# Patient Record
Sex: FEMALE | Race: WHITE | NOT HISPANIC OR LATINO | Employment: FULL TIME | URBAN - METROPOLITAN AREA
[De-identification: names, ages, dates, MRNs, and addresses within clinical notes are randomized per-mention and may not be internally consistent; named-entity substitution may affect disease eponyms.]

---

## 2017-02-02 ENCOUNTER — GENERIC CONVERSION - ENCOUNTER (OUTPATIENT)
Dept: OTHER | Facility: OTHER | Age: 52
End: 2017-02-02

## 2017-02-02 LAB — HBA1C MFR BLD HPLC: 5.6 %

## 2017-05-15 ENCOUNTER — ALLSCRIPTS OFFICE VISIT (OUTPATIENT)
Dept: OTHER | Facility: OTHER | Age: 52
End: 2017-05-15

## 2017-06-05 ENCOUNTER — ANESTHESIA EVENT (OUTPATIENT)
Dept: GASTROENTEROLOGY | Facility: HOSPITAL | Age: 52
End: 2017-06-05
Payer: COMMERCIAL

## 2017-06-06 ENCOUNTER — ANESTHESIA (OUTPATIENT)
Dept: GASTROENTEROLOGY | Facility: HOSPITAL | Age: 52
End: 2017-06-06
Payer: COMMERCIAL

## 2017-06-06 ENCOUNTER — GENERIC CONVERSION - ENCOUNTER (OUTPATIENT)
Dept: PERIOP | Facility: HOSPITAL | Age: 52
End: 2017-06-06

## 2017-06-06 ENCOUNTER — HOSPITAL ENCOUNTER (OUTPATIENT)
Facility: HOSPITAL | Age: 52
Setting detail: OUTPATIENT SURGERY
Discharge: HOME | End: 2017-06-06
Attending: SURGERY | Admitting: SURGERY
Payer: COMMERCIAL

## 2017-06-06 VITALS
OXYGEN SATURATION: 99 % | BODY MASS INDEX: 28.61 KG/M2 | RESPIRATION RATE: 16 BRPM | SYSTOLIC BLOOD PRESSURE: 107 MMHG | WEIGHT: 178 LBS | HEIGHT: 66 IN | DIASTOLIC BLOOD PRESSURE: 58 MMHG | HEART RATE: 65 BPM | TEMPERATURE: 96.2 F

## 2017-06-06 RX ORDER — PROPOFOL 10 MG/ML
INJECTION, EMULSION INTRAVENOUS AS NEEDED
Status: DISCONTINUED | OUTPATIENT
Start: 2017-06-06 | End: 2017-06-06 | Stop reason: SURG

## 2017-06-06 RX ORDER — SODIUM CHLORIDE 9 MG/ML
125 INJECTION, SOLUTION INTRAVENOUS CONTINUOUS
Status: DISCONTINUED | OUTPATIENT
Start: 2017-06-06 | End: 2017-06-06 | Stop reason: HOSPADM

## 2017-06-06 RX ADMIN — PROPOFOL 50 MG: 10 INJECTION, EMULSION INTRAVENOUS at 07:39

## 2017-06-06 RX ADMIN — PROPOFOL 50 MG: 10 INJECTION, EMULSION INTRAVENOUS at 07:44

## 2017-06-06 RX ADMIN — SODIUM CHLORIDE 125 ML/HR: 0.9 INJECTION, SOLUTION INTRAVENOUS at 06:59

## 2017-06-06 RX ADMIN — PROPOFOL 100 MG: 10 INJECTION, EMULSION INTRAVENOUS at 07:37

## 2017-06-13 DIAGNOSIS — K57.32 DIVERTICULITIS OF LARGE INTESTINE WITHOUT PERFORATION OR ABSCESS WITHOUT BLEEDING: ICD-10-CM

## 2017-06-13 DIAGNOSIS — R10.9 ABDOMINAL PAIN: ICD-10-CM

## 2017-06-15 ENCOUNTER — TRANSCRIBE ORDERS (OUTPATIENT)
Dept: LAB | Facility: CLINIC | Age: 52
End: 2017-06-15

## 2017-06-15 ENCOUNTER — APPOINTMENT (OUTPATIENT)
Dept: LAB | Facility: CLINIC | Age: 52
End: 2017-06-15
Payer: COMMERCIAL

## 2017-06-15 DIAGNOSIS — K57.32 DIVERTICULITIS OF COLON (WITHOUT MENTION OF HEMORRHAGE)(562.11): Primary | ICD-10-CM

## 2017-06-15 DIAGNOSIS — Z00.8 HEALTH EXAMINATION IN POPULATION SURVEY: ICD-10-CM

## 2017-06-15 DIAGNOSIS — K57.32 DIVERTICULITIS OF COLON (WITHOUT MENTION OF HEMORRHAGE)(562.11): ICD-10-CM

## 2017-06-15 DIAGNOSIS — Z00.8 HEALTH EXAMINATION IN POPULATION SURVEY: Primary | ICD-10-CM

## 2017-06-15 LAB
CHOLEST SERPL-MCNC: 179 MG/DL (ref 50–200)
EST. AVERAGE GLUCOSE BLD GHB EST-MCNC: 117 MG/DL
HBA1C MFR BLD: 5.7 % (ref 4.2–6.3)
HDLC SERPL-MCNC: 65 MG/DL (ref 40–60)
LDLC SERPL CALC-MCNC: 97 MG/DL (ref 0–100)
TRIGL SERPL-MCNC: 84 MG/DL

## 2017-06-15 PROCEDURE — 86255 FLUORESCENT ANTIBODY SCREEN: CPT

## 2017-06-15 PROCEDURE — 83036 HEMOGLOBIN GLYCOSYLATED A1C: CPT

## 2017-06-15 PROCEDURE — 80061 LIPID PANEL: CPT

## 2017-06-15 PROCEDURE — 82784 ASSAY IGA/IGD/IGG/IGM EACH: CPT

## 2017-06-15 PROCEDURE — 36415 COLL VENOUS BLD VENIPUNCTURE: CPT

## 2017-06-15 PROCEDURE — 83516 IMMUNOASSAY NONANTIBODY: CPT

## 2017-06-20 ENCOUNTER — GENERIC CONVERSION - ENCOUNTER (OUTPATIENT)
Dept: OTHER | Facility: OTHER | Age: 52
End: 2017-06-20

## 2017-07-11 ENCOUNTER — GENERIC CONVERSION - ENCOUNTER (OUTPATIENT)
Dept: OTHER | Facility: OTHER | Age: 52
End: 2017-07-11

## 2017-07-11 ENCOUNTER — LAB REQUISITION (OUTPATIENT)
Dept: LAB | Facility: HOSPITAL | Age: 52
End: 2017-07-11
Payer: COMMERCIAL

## 2017-07-11 DIAGNOSIS — R10.9 ABDOMINAL PAIN: ICD-10-CM

## 2017-07-11 LAB
BILIRUB UR QL STRIP: NEGATIVE
CLARITY UR: NORMAL
COLOR UR: CLEAR
GLUCOSE (HISTORICAL): NEGATIVE
HGB UR QL STRIP.AUTO: NEGATIVE
KETONES UR STRIP-MCNC: NEGATIVE MG/DL
LEUKOCYTE ESTERASE UR QL STRIP: NORMAL
NITRITE UR QL STRIP: NEGATIVE
PH UR STRIP.AUTO: 5 [PH]
PROT UR STRIP-MCNC: NEGATIVE MG/DL
SP GR UR STRIP.AUTO: 1
UROBILINOGEN UR QL STRIP.AUTO: NEGATIVE

## 2017-07-11 PROCEDURE — 87086 URINE CULTURE/COLONY COUNT: CPT | Performed by: INTERNAL MEDICINE

## 2017-07-12 LAB — BACTERIA UR CULT: NORMAL

## 2017-08-16 ENCOUNTER — ALLSCRIPTS OFFICE VISIT (OUTPATIENT)
Dept: OTHER | Facility: OTHER | Age: 52
End: 2017-08-16

## 2017-08-16 ENCOUNTER — TRANSCRIBE ORDERS (OUTPATIENT)
Dept: ADMINISTRATIVE | Facility: HOSPITAL | Age: 52
End: 2017-08-16

## 2017-08-16 DIAGNOSIS — R63.5 ABNORMAL WEIGHT GAIN: ICD-10-CM

## 2017-08-16 DIAGNOSIS — I34.0 NONRHEUMATIC MITRAL VALVE INSUFFICIENCY: ICD-10-CM

## 2017-08-16 DIAGNOSIS — R73.01 IMPAIRED FASTING GLUCOSE: ICD-10-CM

## 2017-08-16 DIAGNOSIS — E55.9 VITAMIN D DEFICIENCY: ICD-10-CM

## 2017-08-16 DIAGNOSIS — I34.1 J.B. BARLOW'S SYNDROME: Primary | ICD-10-CM

## 2017-08-16 DIAGNOSIS — E66.3 OVERWEIGHT(278.02): ICD-10-CM

## 2017-08-21 ENCOUNTER — GENERIC CONVERSION - ENCOUNTER (OUTPATIENT)
Dept: OTHER | Facility: OTHER | Age: 52
End: 2017-08-21

## 2017-08-21 ENCOUNTER — APPOINTMENT (OUTPATIENT)
Dept: LAB | Facility: CLINIC | Age: 52
End: 2017-08-21
Payer: COMMERCIAL

## 2017-08-21 ENCOUNTER — TRANSCRIBE ORDERS (OUTPATIENT)
Dept: LAB | Facility: CLINIC | Age: 52
End: 2017-08-21

## 2017-08-21 DIAGNOSIS — R63.5 ABNORMAL WEIGHT GAIN: ICD-10-CM

## 2017-08-21 DIAGNOSIS — E66.3 OVERWEIGHT(278.02): ICD-10-CM

## 2017-08-21 DIAGNOSIS — R73.01 IMPAIRED FASTING GLUCOSE: ICD-10-CM

## 2017-08-21 DIAGNOSIS — E55.9 VITAMIN D DEFICIENCY: ICD-10-CM

## 2017-08-21 LAB
25(OH)D3 SERPL-MCNC: 28.3 NG/ML (ref 30–100)
ALBUMIN SERPL BCP-MCNC: 3.9 G/DL (ref 3.5–5)
ALP SERPL-CCNC: 80 U/L (ref 46–116)
ALT SERPL W P-5'-P-CCNC: 20 U/L (ref 12–78)
ANION GAP SERPL CALCULATED.3IONS-SCNC: 3 MMOL/L (ref 4–13)
AST SERPL W P-5'-P-CCNC: 11 U/L (ref 5–45)
BILIRUB SERPL-MCNC: 0.48 MG/DL (ref 0.2–1)
BUN SERPL-MCNC: 12 MG/DL (ref 5–25)
CALCIUM SERPL-MCNC: 9.4 MG/DL (ref 8.3–10.1)
CHLORIDE SERPL-SCNC: 103 MMOL/L (ref 100–108)
CO2 SERPL-SCNC: 33 MMOL/L (ref 21–32)
CREAT SERPL-MCNC: 0.67 MG/DL (ref 0.6–1.3)
GFR SERPL CREATININE-BSD FRML MDRD: 101 ML/MIN/1.73SQ M
GLUCOSE P FAST SERPL-MCNC: 80 MG/DL (ref 65–99)
POTASSIUM SERPL-SCNC: 3.8 MMOL/L (ref 3.5–5.3)
PROT SERPL-MCNC: 7.1 G/DL (ref 6.4–8.2)
SODIUM SERPL-SCNC: 139 MMOL/L (ref 136–145)

## 2017-08-21 PROCEDURE — 80053 COMPREHEN METABOLIC PANEL: CPT

## 2017-08-21 PROCEDURE — 82306 VITAMIN D 25 HYDROXY: CPT

## 2017-08-21 PROCEDURE — 36415 COLL VENOUS BLD VENIPUNCTURE: CPT

## 2017-08-29 ENCOUNTER — ALLSCRIPTS OFFICE VISIT (OUTPATIENT)
Dept: OTHER | Facility: OTHER | Age: 52
End: 2017-08-29

## 2017-08-31 ENCOUNTER — HOSPITAL ENCOUNTER (OUTPATIENT)
Dept: NON INVASIVE DIAGNOSTICS | Facility: CLINIC | Age: 52
Discharge: HOME | End: 2017-08-31
Payer: COMMERCIAL

## 2017-08-31 DIAGNOSIS — I34.0 NONRHEUMATIC MITRAL VALVE INSUFFICIENCY: ICD-10-CM

## 2017-08-31 PROCEDURE — 93306 TTE W/DOPPLER COMPLETE: CPT

## 2017-09-01 ENCOUNTER — GENERIC CONVERSION - ENCOUNTER (OUTPATIENT)
Dept: OTHER | Facility: OTHER | Age: 52
End: 2017-09-01

## 2017-09-07 ENCOUNTER — GENERIC CONVERSION - ENCOUNTER (OUTPATIENT)
Dept: OTHER | Facility: OTHER | Age: 52
End: 2017-09-07

## 2017-09-14 ENCOUNTER — GENERIC CONVERSION - ENCOUNTER (OUTPATIENT)
Dept: OTHER | Facility: OTHER | Age: 52
End: 2017-09-14

## 2017-09-15 ENCOUNTER — ALLSCRIPTS OFFICE VISIT (OUTPATIENT)
Dept: OTHER | Facility: OTHER | Age: 52
End: 2017-09-15

## 2017-09-21 ENCOUNTER — GENERIC CONVERSION - ENCOUNTER (OUTPATIENT)
Dept: OTHER | Facility: OTHER | Age: 52
End: 2017-09-21

## 2018-01-10 NOTE — CONSULTS
Chief Complaint  Chief Complaint Free Text Note Form: New patient here for MWM consult; Stop Bang 1/8  History of Present Illness  Free Text HPI: Excess weight/weight gain-  Severity: Mild  Onset: past 5 years  Modifiers: hectic work schedule, taking care of elderly patients, decreased physical activity; regular exercise has been the most helpful   Associated Symptoms: feels horrible, self image issues  Past Medical History    1  History of Colon cancer screening (V76 51) (Z12 11)   2  History of hematuria (V13 09) (Z87 448)   3  History of obesity (V13 89) (Z86 39)  Active Problems And Past Medical History Reviewed: The active problems and past medical history were reviewed and updated today  Assessment    1  Weight gain (783 1) (R63 5)   2  Impaired fasting glucose (790 21) (R73 01)   3  Mitral regurgitation (424 0) (I34 0)   4  Overweight (BMI 25 0-29 9) (278 02) (E66 3)   5  Vitamin D deficiency (268 9) (E55 9)    Plan   1  (1) COMPREHENSIVE METABOLIC PANEL; Status:Active; Requested for:45Lxu9389;    2  (1) VITAMIN D 25-HYDROXY; Status:Active; Requested for:44Sjk8360;    3  ECHO COMPLETE WITH CONTRAST IF INDICATED; Status:Need Information - Financial   Authorization; Requested for:67Dml8825;     Discussion/Summary  Discussion Summary:   78-year-old female with impaired fasting glucose and excess weight here to pursue medical weight management to improve weight and health  Excess weight/weight gain:  -discussed options of conservative vs ANDREA program +/- meal replacement vs VLCD  -initial focus of 5-10% weight loss over 3-6 mos for improved health  -screening labs-TSH normal 1 year ago, check CMP    Impaired fasting glucose:  -A1c 5 7%  -Lifestyle changes for now    Mitral regurgitation:  -Diagnosed 20+ years ago did not have a repeat echo  Check 2-D echo   Denies symptoms    Vitamin D deficiency:  -Repeat vitamin D    Patient interested in intensive lifestyle intervention program  We'll set up visit with dietitian, I will see patient again at program end ~12 weeks    Goals:  1  Food log www  myfitnesspal com  2  No sugary beverages  At least 64oz of water daily  3  Increase physical activity by 10 minutes daily  4  3041-9729 calories, see sample menu  Patient's Capacity to Self-Care: Patient is able to Self-Care  Understands and agrees with treatment plan: The treatment plan was reviewed with the patient/guardian  The patient/guardian understands and agrees with the treatment plan   Self Referrals:   Self Referrals: No      Review of Systems  Focused-Female:   Constitutional: no fever  ENT: no sore throat  Cardiovascular: no chest pain and no palpitations  Respiratory: no shortness of breath and no wheezing  Gastrointestinal: no abdominal pain, no nausea and no constipation  Genitourinary: no dysuria  Musculoskeletal: no arthralgias  Integumentary: no rashes  Neurological: no headache  Other Symptoms: Psych: denies depression/anxiety  Active Problems    1  Diverticulitis of colon (562 11) (K57 32)   2  Flank pain (789 09) (R10 9)   3  Impaired fasting glucose (790 21) (R73 01)   4  Lipoma (214 9) (D17 9)   5  Microscopic hematuria (599 72) (R31 29)   6  Mitral regurgitation (424 0) (I34 0)   7  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)   8  Vitamin D deficiency (268 9) (E55 9)    Surgical History    1  Denied: History Of Prior Surgery  Surgical History Reviewed: The surgical history was reviewed and updated today  Family History  Mother    1  Family history of hypertension (V17 49) (Z82 49)   2  Family history of Stroke  Father    3  Family history of    4  Family history of diabetes mellitus (V18 0) (Z83 3)   5  Family history of Non-Hodgkin lymphoma  Brother    6  Family history of coronary artery disease (V17 3) (Z82 49)   7  Family history of diabetes mellitus (V18 0) (Z83 3)  Other    8   Family history of coronary artery disease (V17 3) (Z82 49)  Family History Reviewed: The family history was reviewed and updated today  Social History    · Never A Smoker  Social History Reviewed: The social history was reviewed and updated today  Current Meds   1  No Reported Medications  Requested for: 30Bso4523 Recorded  Medication List Reviewed: The medication list was reviewed and updated today  Allergies    1  No Known Drug Allergies    2  Dust   3  Mold   4  No Known Environmental Allergies   5  No Known Food Allergies    Vitals  Vital Signs    Recorded: 98Tgu4311 08:07AM   Temperature 98 1 F   Heart Rate 76   Respiration 16   Systolic 182   Diastolic 76   Height 5 ft 6 in   Weight 182 lb    BMI Calculated 29 38   BSA Calculated 1 92   Waist Circumference 33   Neck Circumference 13     Physical Exam    Constitutional   General appearance: Abnormal   well developed and overweight  Eyes No conjunctival pallor  Ears, Nose, Mouth, and Throat Moist oral mucosa  Pulmonary   Respiratory effort: No increased work of breathing or signs of respiratory distress  Auscultation of lungs: Clear to auscultation  Cardiovascular   Auscultation of heart: Normal rate and rhythm, normal S1 and S2, without murmurs  No appreciable murmur  Abdomen   Abdomen: Abnormal   The abdomen was soft and nontender     Musculoskeletal   Gait and station: Normal     Psychiatric   Orientation to person, place, and time: Normal     Mood and affect: Normal          Signatures   Electronically signed by : VIC Valencia ; Aug 16 2017  9:03AM EST                       (Author)

## 2018-01-11 NOTE — RESULT NOTES
Verified Results  ECHO COMPLETE WITH CONTRAST IF INDICATED 55Ryg1218 10:40AM Norbert Butterfield Order Number: [id number]   Performing Comments: appt 17 @@ 1pm [facility] [place] in [facility]  No Prior auth required  - Patient Instructions: To schedule this appointment, please contact Central Scheduling at [phone]  Test Name Result Flag Reference   ECHO COMPLETE WITH CONTRAST IF INDICATED (Report)     Pete Brown 35  [facility], [address]   [phone]     Transthoracic Echocardiogram   2D, M-mode, Doppler, and Color Doppler     Study date: 31-Aug-2017     Patient: Leo Mahoney   MR number: [id number]   Account number: [de-identified]   : 1965   Age: 46 years   Gender: Female   Status: Outpatient   Location: [address] [place] [address]   Height: 66 in   Weight: 180 lb   BP: 120/ 64 mmHg     Indications: Murmur  Diagnoses: I34 9 - Nonrheumatic mitral valve disorder, unspecified     Sonographer: AMNA De La O   Primary Physician: Dinesh Barnes MD   Referring Physician: Aissatou Tolentino MD   Group: [facility]   Interpreting Physician: DO SANDEEP Lebron     LEFT VENTRICLE:   Systolic function was normal  Ejection fraction was estimated to be 60 %  There were no regional wall motion abnormalities  HISTORY: PRIOR HISTORY: Patient has no history of cardiovascular disease  PROCEDURE: The study was performed in the [address] [place] [address]  This was a routine study  The transthoracic approach was used  The study included complete 2D imaging, M-mode, complete spectral Doppler, and color Doppler  The   heart rate was 65 bpm, at the start of the study  Image quality was adequate  LEFT VENTRICLE: Size was normal  Systolic function was normal  Ejection fraction was estimated to be 60 %  There were no regional wall motion abnormalities   Wall thickness was normal  DOPPLER: Left ventricular diastolic function parameters   were normal      RIGHT VENTRICLE: The size was normal  Systolic function was normal  Wall thickness was normal      LEFT ATRIUM: Size was normal      RIGHT ATRIUM: Size was normal      MITRAL VALVE: Valve structure was normal  There was normal leaflet separation  DOPPLER: The transmitral velocity was within the normal range  There was no evidence for stenosis  There was no regurgitation  AORTIC VALVE: The valve was trileaflet  Leaflets exhibited normal thickness and normal cuspal separation  DOPPLER: Transaortic velocity was within the normal range  There was no evidence for stenosis  There was no regurgitation  TRICUSPID VALVE: The valve structure was normal  There was normal leaflet separation  DOPPLER: The transtricuspid velocity was within the normal range  There was no evidence for stenosis  There was no regurgitation  PULMONIC VALVE: Leaflets exhibited normal thickness, no calcification, and normal cuspal separation  DOPPLER: The transpulmonic velocity was within the normal range  There was no regurgitation  PERICARDIUM: There was no pericardial effusion  AORTA: The root exhibited normal size  SYSTEMIC VEINS: IVC: The inferior vena cava was normal in size and course  Respirophasic changes were normal      PULMONARY ARTERY: DOPPLER: The tricuspid jet envelope definition was inadequate for estimation of RV systolic pressure  There are no indirect findings (abnormal RV volume or geometry, altered pulmonary flow velocity profile, or leftward   septal displacement) which would suggest moderate or severe pulmonary hypertension       SYSTEM MEASUREMENT TABLES     2D   %FS: 39 3 %   Ao Diam: 2 9 cm   EDV(Teich): 79 5 ml   EF(Teich): 70 1 %   ESV(Teich): 23 7 ml   IVSd: 0 8 cm   LA Area: 12 cm2   LA Diam: 2 9 cm   LVEDV MOD A4C: 70 2 ml   LVEF MOD A4C: 71 2 %   LVESV MOD A4C: 20 2 ml   LVIDd: 4 2 cm   LVIDs: 2 6 cm   LVLd A4C: 7 2 cm   LVLs A4C: 5 5 cm   LVPWd: 0 8 cm   RA Area: 11 5 cm2   RVIDd: 3 2 cm   SV MOD A4C: 49 9 ml   SV(Teich): 55 8 ml     MM   TAPSE: 1 8 cm     PW   AVC: 390 8 ms   E': 0 1 m/s   E/E': 5 1   MV A Nils: 0 6 m/s   MV Dec Dickenson: 2 7 m/s2   MV DecT: 229 5 ms   MV E Nils: 0 6 m/s   MV E/A Ratio: 1   MV PHT: 66 6 ms   MVA By PHT: 3 3 cm2     [employer] Accredited Echocardiography Laboratory     Prepared and electronically signed by     Uli Jackson DO   Signed 31-Aug-2017 16:58:11

## 2018-01-12 VITALS
HEIGHT: 66 IN | WEIGHT: 182 LBS | RESPIRATION RATE: 16 BRPM | HEART RATE: 76 BPM | SYSTOLIC BLOOD PRESSURE: 120 MMHG | BODY MASS INDEX: 29.25 KG/M2 | TEMPERATURE: 98.1 F | DIASTOLIC BLOOD PRESSURE: 76 MMHG

## 2018-01-13 VITALS — BODY MASS INDEX: 28.73 KG/M2 | HEIGHT: 66 IN | WEIGHT: 178.8 LBS

## 2018-01-13 VITALS
RESPIRATION RATE: 16 BRPM | BODY MASS INDEX: 28.63 KG/M2 | DIASTOLIC BLOOD PRESSURE: 82 MMHG | SYSTOLIC BLOOD PRESSURE: 126 MMHG | WEIGHT: 178.13 LBS | HEART RATE: 72 BPM | HEIGHT: 66 IN | TEMPERATURE: 99.2 F

## 2018-01-14 VITALS — BODY MASS INDEX: 29.02 KG/M2 | WEIGHT: 180.6 LBS | HEIGHT: 66 IN

## 2018-01-14 NOTE — RESULT NOTES
Verified Results  (1) COMPREHENSIVE METABOLIC PANEL [id number] 48:42PR Jorge Goodwin Order Number: [id number]     Test Name Result Flag Reference   SODIUM 139 mmol/L  136-145   POTASSIUM 3 8 mmol/L  3 5-5 3   CHLORIDE 103 mmol/L  100-108   CARBON DIOXIDE 33 mmol/L H 21-32   ANION GAP (CALC) 3 mmol/L L 4-13   BLOOD UREA NITROGEN 12 mg/dL  5-25   CREATININE 0 67 mg/dL  0 60-1 30   Standardized to IDMS reference method   CALCIUM 9 4 mg/dL  8 3-10 1   BILI, TOTAL 0 48 mg/dL  0 20-1 00   ALK PHOSPHATAS 80 U/L     ALT (SGPT) 20 U/L  12-78   Specimen collection should occur prior to Sulfasalazine and/or Sulfapyridine administration due to the potential for falsely depressed results  AST(SGOT) 11 U/L  5-45   Specimen collection should occur prior to Sulfasalazine administration due to the potential for falsely depressed results  ALBUMIN 3 9 g/dL  3 5-5 0   TOTAL PROTEIN 7 1 g/dL  6 4-8 2   eGFR 101 ml/min/1 73sq m     [facility] Disease Education Program recommendations are as follows:  GFR calculation is accurate only with a steady state creatinine  Chronic Kidney disease less than 60 ml/min/1 73 sq  meters  Kidney failure less than 15 ml/min/1 73 sq  meters  GLUCOSE FASTING 80 mg/dL  65-99   Specimen collection should occur prior to Sulfasalazine administration due to the potential for falsely depressed results  Specimen collection should occur prior to Sulfapyridine administration due to the potential for falsely elevated results       (1) VITAMIN D 25-HYDROXY 86Vwn2790 11:08AM Starr Maldonado    Order Number: [id number]     Test Name Result Flag Reference   VIT D 25-HYDROX 28 3 ng/mL L 30 0-100 0   This assay is a certified procedure of the CDC Vitamin D Standardization Certification Program (VDSCP)     Deficiency <20ng/ml   Insufficiency 20-30ng/ml   Sufficient  ng/ml     *Patients undergoing fluorescein dye angiography may retain small amounts of fluorescein in the body for 48-72 hours post procedure  Samples containing fluorescein can produce falsely elevated Vitamin D values  If the patient had this procedure, a specimen should be resubmitted post fluorescein clearance

## 2018-01-15 VITALS — HEIGHT: 66 IN | WEIGHT: 180 LBS | BODY MASS INDEX: 28.93 KG/M2

## 2018-01-15 NOTE — PROGRESS NOTES
History of Present Illness  HPI: Norm Liu is here for f/u  Current wt: 179 6 lbs  Loss of 1 2 lbs x 2 wks  Having difficulty having breakfast, finds she starts with a meal replacement but it takes her until 11 a m  to finish  Then skips a m  snack and by end of day consuming <900 calories/day  Discussed importance of breakfast and snack suggestions given as well as suggestions for remembering the snack  We also discussed exercise and ways to fit this in  Bariatric MNT [facility]:   Weight Assessment: IBW: 130 lbs, ABW: 142 2 lbs, Weight Change: 1 2 lb loss x 2 wks, Goal Weight: 160 lbs  Excess calories come from large portions at mealtimes and eating out, alcohol  Dietary Recall:   Breakfast: replacement  Snack: skip  Lunch: replacement  Snack: apple/cheese or pb/carrots  Dinner: salmon, veggies,  Snack: skip   Beverages: water, coffee  Alcohol consumption: wine  Food allergies/intolerances: n/a  Cooking: spouse  Shopping: self  Exercise Frequency:  She exercises   Nutrition Prescription: Estimated calories for weight loss: eCalc BMR 1431, wt loss w/o exercise 717-1217, w/exercise 702-5955, Estimated daily protein needs: 71-89 gm (1 2-1 5 gm/kg IBW) and Estimated daily fluid needs: 69 oz (35 cc/kg IBW)  Nutrition Intervention: Counseling provided with emphasis on portion sizes, healthy snack choices, hydration, protein intake, exercise and food journaling  Education materials provided: [employer] manual    Comprehension: good  Expected Compliance: good  Goals: follow meal plan prescribed, plan meals and snacks daily, food journal, do not skip meals or snacks and 1000 calories using 2 replacements and 0-1 bar  Monitor/Evaluation: weekly weight, food journal, fluid intake and exercise level  Active Problems    1  Diverticulitis of colon (562 11) (K57 32)   2  Flank pain (789 09) (R10 9)   3  Impaired fasting glucose (790 21) (R73 01)   4  Lipoma (214 9) (D17 9)   5  Microscopic hematuria (599 72) (R31 29)   6  Mitral regurgitation (424 0) (I34 0)   7  Overweight (BMI 25 0-29 9) (278 02) (E66 3)   8  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)   9  Vitamin D deficiency (268 9) (E55 9)   10  Weight gain (783 1) (R63 5)    Past Medical History    1  History of Colon cancer screening (V76 51) (Z12 11)   2  History of Colon cancer screening (V76 51) (Z12 11)   3  History of hematuria (V13 09) (Z87 448)   4  History of obesity (V13 89) (Z86 39)   5  History of Screening for deficiency anemia (V78 1) (Z13 0)   6  History of Screening for thyroid disorder (V77 0) (Z13 29)   7  History of Vaginal candidiasis (112 1) (B37 3)    Surgical History    1  Denied: History Of Prior Surgery    Family History  Mother    1  Family history of hypertension (V17 49) (Z82 49)   2  Family history of Stroke  Father    3  Family history of    4  Family history of diabetes mellitus (V18 0) (Z83 3)   5  Family history of Non-Hodgkin lymphoma  Brother    6  Family history of coronary artery disease (V17 3) (Z82 49)   7  Family history of diabetes mellitus (V18 0) (Z83 3)  Other    8  Family history of coronary artery disease (V17 3) (Z82 49)    Social History    · Consumes alcohol occasionally (V49 89) (Z78 9)   · Never A Smoker   · No illicit drug use    Current Meds   1  No Reported Medications  Requested for: 13Eeg2659 Recorded    Allergies    1  No Known Drug Allergies    2  Dust   3  Mold   4  No Known Environmental Allergies   5  No Known Food Allergies    Vitals  Signs   Recorded: 22Elo8613 11:32AM   Height: 5 ft 6 in  Weight: 179 lb 9 6 oz  BMI Calculated: 28 99  BSA Calculated: 1 91    Future Appointments    Date/Time Provider Specialty Site   2017 08:00 AM VIC Neff  Bariatric Medicine [facility]     Signatures   Electronically signed by :  Zeferino Sr, ; Sep 15 2017 12:10PM EST                       (Author)    Electronically signed by VIC Jones ; Sep 15 2017  1:40PM EST                       (Co-author)

## 2018-01-17 NOTE — MISCELLANEOUS
Message   Recorded as Task   Date: 06/07/2017 09:21 AM, Created By: Anamika Lainez   Task Name: Follow Up   Assigned To: [facility],Team   Regarding Patient: Kira Franz, Status: Active   [facility]able [facility] - 07 Jun 2017 9:21 AM     TASK CREATED  Routine post colonoscopy call placed to patient  She had this procedure on 06/06/2017  Left message  on patient's phone number to please give our office a call back  No pathology was sent  Patient needs another colonoscopy in 10 years  Izabela Rodriguez - 15 Andrew 2017 9:28 AM     TASK EDITED  Left another message on patient's phone number to please give our office a call back  Annalee Stewart - 20 Jun 2017 10:09 AM     TASK EDITED  Mailed letter to patients home address  Tasked PCPs office  {Updated pre-visit planning to reflect 10 year follow up }        Active Problems    1  Abnormal colonoscopy (793 4) (R93 3)   2  Acute bronchitis (466 0) (J20 9)   3  Acute cystitis with hematuria (595 0) (N30 01)   4  Colon cancer screening (V76 51) (Z12 11)   5  Diverticulitis of colon (562 11) (K57 32)   6  Encounter for gynecological examination (V72 31) (Z01 419)   7  Encounter for screening mammogram for breast cancer (V76 12) (Z12 31)   8  Encounter for screening mammogram for malignant neoplasm of breast (V76 12)   (Z12 31)   9  Flu vaccine need (V04 81) (Z23)   10  Impaired fasting glucose (790 21) (R73 01)   11  Lipoma (214 9) (D17 9)   12  Microscopic hematuria (599 72) (R31 29)   13  Mitral regurgitation (424 0) (I34 0)   14  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)   15  Screening for deficiency anemia (V78 1) (Z13 0)   16  Screening for thyroid disorder (V77 0) (Z13 29)   17  Sinusitis (473 9) (J32 9)   18  Urinary tract infection (599 0) (N39 0)   19  UTI symptoms (788 99) (R39 9)   20  Vaginal candidiasis (112 1) (B37 3)   21  Vitamin D deficiency (268 9) (E55 9)    Allergies    1  No Known Drug Allergies    2  Dust   3  Mold   4  No Known Environmental Allergies   5   No Known Food Allergies    Signatures   Electronically signed by : Trudi Rubinstein, ; Jun 20 2017 10:09AM EST                       (Author)

## 2018-01-18 NOTE — PROGRESS NOTES
History of Present Illness  HPI: Philipp Espinoza is here for initial RD visit for [employer]  Current wt 180 8 lbs  Came to visit prepared, kept a food journal prior to coming  Biggest issue appears to be skipping meal, long intervals between meals  States she &  are very social and do eat out often  Discussed utilizing 2 meal replacements on these days to naturally keep calories lower and then choosing 2 vegetables vs  starch when out  She also is working on decreasing amount of wine she drinks as she finds it is making her hungrier  No consistent physical activity but does have 2 gym memberships  Would like to lose 20 lbs due to daughter's upcoming wedding  Bariatric MNT [facility]:   Weight Assessment: IBW: 130 lbs, ABW: 142 7 lbs, Goal Weight: 160 lbs  Excess calories come from large portions at mealtimes and meal alcohol, eating out  Dietary Recall:   Breakfast: wake 5-6 (leave for work 7-8)  time varies 80 nay yogurt, 1/4 c cottage cheese, fruit, crushed almonds 15 OR [employer] protein powder (18 gm) w/banana, pb & almond milk OR eggs on weekend    Snack: skip  Lunch: 1-2: salad w/turkey/ham, light balsamic or garlic expressions 1 tbsp, cheese stick  Snack: skip  Dinner: 7-8 p m  >5-6 oz chicken, filet, veggies  Snack: wine   Beverages: water, coffee 1-2 w/creamer  Estimated fluid intake: 24 oz  Alcohol consumption: wine  Food allergies/intolerances: n/a  Cooking: spouse  Shopping: self  She dines out 3-4 times per week  Exercise Frequency:  She does not exercise  Nutrition Prescription: Estimated calories for weight loss: [place] BMR 1479x1  3-1567=544, eCalc bMR 1442, wt loss w/o exercise 730-1230 , Estimated daily protein needs: 71-89 gm (1 2-1 5 gm/kg IBW) and Estimated daily fluid needs: 69 oz (35 cc/kg IBW)  Breakfast: replacement, 8 oz almond milk, 2 tbsp ff half and half  Snack: food snack  Lunch: replacement  Snack: food snack or bar     Dinner: 3 oz lean pro, 2 veggies, 2 fat  Snack: skip or food snack   Nutrition Intervention: Counseling provided with emphasis on meal planning, portion sizes, healthy snack choices, hydration, fiber intake, protein intake, exercise and food journaling  Education materials provided: [employer] manual    Comprehension: good  Expected Compliance: good  Goals: follow meal plan prescribed, reduce portion sizes at mealtimes, plan meals and snacks daily, Choose lower-calorie, lower-fat meal options at home and when dining out, food journal, do not skip meals or snacks and 870-1220 calories using 2 replacements and 0-1 bar  Monitor/Evaluation: weekly weight, food journal, fluid intake and exercise level  Active Problems    1  Diverticulitis of colon (562 11) (K57 32)   2  Flank pain (789 09) (R10 9)   3  Impaired fasting glucose (790 21) (R73 01)   4  Lipoma (214 9) (D17 9)   5  Microscopic hematuria (599 72) (R31 29)   6  Mitral regurgitation (424 0) (I34 0)   7  Overweight (BMI 25 0-29 9) (278 02) (E66 3)   8  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)   9  Vitamin D deficiency (268 9) (E55 9)   10  Weight gain (783 1) (R63 5)    Past Medical History    1  History of Colon cancer screening (V76 51) (Z12 11)   2  History of Colon cancer screening (V76 51) (Z12 11)   3  History of hematuria (V13 09) (Z87 448)   4  History of obesity (V13 89) (Z86 39)   5  History of Screening for deficiency anemia (V78 1) (Z13 0)   6  History of Screening for thyroid disorder (V77 0) (Z13 29)   7  History of Vaginal candidiasis (112 1) (B37 3)    Surgical History    1  Denied: History Of Prior Surgery    Family History  Mother    1  Family history of hypertension (V17 49) (Z82 49)   2  Family history of Stroke  Father    3  Family history of    4  Family history of diabetes mellitus (V18 0) (Z83 3)   5  Family history of Non-Hodgkin lymphoma  Brother    6   Family history of coronary artery disease (V17 3) (Z82 49) 7  Family history of diabetes mellitus (V18 0) (Z83 3)  Other    8  Family history of coronary artery disease (V17 3) (Z82 49)    Social History    · Consumes alcohol occasionally (V49 89) (Z78 9)   · Never A Smoker   · No illicit drug use    Current Meds   1  No Reported Medications  Requested for: 98Gmk6047 Recorded    Allergies    1  No Known Drug Allergies    2  Dust   3  Mold   4  No Known Environmental Allergies   5  No Known Food Allergies    Vitals  Signs   Recorded: 59Kck8078 03:34PM   Height: 5 ft 6 in  Weight: 180 lb 12 8 oz  BMI Calculated: 29 18  BSA Calculated: 1 92    Results/Data  [facility]ita Flowsheet [id number] 03:34PM Bettina Petit     Test Name Result Flag Reference   Height 66     Weight 180 8     Body Fat % 40 8     Fat Mass 73 8     FFM ( Fat Free Mass) 107 0     Muscle Mass 101 6     TBW ( Total Body Water) 72 6     TBW % 40 2     Bone Mass 5 4     BMR ( Basal Metabolic Rate) 4819     Metabolic Age 74     Visceral Fat Rating 9     BMI 29 2         Future Appointments    Date/Time Provider Specialty Site   09/15/2017 11:30 AM Bettina Petit  [facility]   12/20/2017 08:00 AM VIC Whitten  Bariatric Medicine [facility]     Signatures   Electronically signed by :  Lucretia Weber, ; Aug 29 2017  4:27PM EST                       (Author)    Electronically signed by : VIC Cifuentes ; Aug 30 2017  9:03AM EST                       (Co-author)

## 2018-01-22 VITALS — WEIGHT: 180.8 LBS | HEIGHT: 66 IN | BODY MASS INDEX: 29.06 KG/M2

## 2018-01-22 VITALS — BODY MASS INDEX: 28.87 KG/M2 | HEIGHT: 66 IN | WEIGHT: 179.6 LBS

## 2018-02-05 ENCOUNTER — TRANSCRIBE ORDERS (OUTPATIENT)
Dept: OBGYN CLINIC | Facility: CLINIC | Age: 53
End: 2018-02-05

## 2018-02-05 ENCOUNTER — OFFICE VISIT (OUTPATIENT)
Dept: OBGYN CLINIC | Facility: CLINIC | Age: 53
End: 2018-02-05
Payer: COMMERCIAL

## 2018-02-05 VITALS
SYSTOLIC BLOOD PRESSURE: 130 MMHG | WEIGHT: 184.6 LBS | BODY MASS INDEX: 29.67 KG/M2 | DIASTOLIC BLOOD PRESSURE: 84 MMHG | HEIGHT: 66 IN

## 2018-02-05 DIAGNOSIS — R63.5 RECENT WEIGHT GAIN: ICD-10-CM

## 2018-02-05 DIAGNOSIS — N95.0 POST-MENOPAUSE BLEEDING: Primary | ICD-10-CM

## 2018-02-05 DIAGNOSIS — N95.0 POSTMENOPAUSAL BLEEDING: Primary | ICD-10-CM

## 2018-02-05 PROCEDURE — 88305 TISSUE EXAM BY PATHOLOGIST: CPT | Performed by: PATHOLOGY

## 2018-02-05 PROCEDURE — 88305 TISSUE EXAM BY PATHOLOGIST: CPT | Performed by: OBSTETRICS & GYNECOLOGY

## 2018-02-05 PROCEDURE — 58100 BIOPSY OF UTERUS LINING: CPT | Performed by: OBSTETRICS & GYNECOLOGY

## 2018-02-05 PROCEDURE — 99213 OFFICE O/P EST LOW 20 MIN: CPT | Performed by: OBSTETRICS & GYNECOLOGY

## 2018-02-05 NOTE — PROGRESS NOTES
Patient is a 38-year-old white female,  in menopause  She presents today with concerns of experiencing spotting 2 weeks ago  States it lasted 2 days and she had to wear a panty liner  Denies bleeding with intercourse  Denies any new medications  She does c/o lower abdomen pressure, feeling bloated and feeling like she gets full fast after eating  She has increasing night sweats  She has gained about 10 lbs in 6 weeks  Her skin is very dry  She has been under increased work stress  Endometrial biopsy  Date/Time: 2018 2:54 PM  Performed by: Ivelisse Sherwood  Authorized by: Ivelisse Sherwood     Consent:     Consent given by:  Patient    Procedural risks discussed:  Bleeding, infection, failure rate and damage to other organs    Patient questions answered: yes      Patient agrees, verbalizes understanding, and wants to proceed: yes      Educational handouts given: no      Instructions and paperwork completed: no    Indication:     Indications: Post-menopausal bleeding      Chronicity of post-menopausal bleeding:  New    Progression of post-menopausal bleeding:  Waxing and waning  Pre-procedure:     Pre-procedure timeout performed: no    Procedure:     Procedure: endometrial biopsy with Pipelle      A bivalve speculum was placed in the vagina: yes      Cervix cleaned and prepped: yes      A paracervical block was performed: no      An intracervical block was performed: no      The cervix was dilated: no      Uterus sounded: yes      Uterus sound depth (cm):  7    Specimen collected: specimen collected and sent to pathology      Patient tolerated procedure well with no complications: yes    Findings:     Uterus size:  Non-gravid    Cervix: normal      Adnexa: normal    Comments:       biopsy was performed to consider using a Pipelle  Scant amount of tissue was obtained  Three passes were done  There was no evidence of perforation  Patient are procedure well  She will be of the pathology results    Also make arrangements for transvaginal ultrasound for endometrial thickness  Impression I believe this is benign  Will also check a TSH

## 2018-02-06 ENCOUNTER — APPOINTMENT (OUTPATIENT)
Dept: LAB | Facility: CLINIC | Age: 53
End: 2018-02-06
Payer: COMMERCIAL

## 2018-02-06 ENCOUNTER — TRANSCRIBE ORDERS (OUTPATIENT)
Dept: LAB | Facility: CLINIC | Age: 53
End: 2018-02-06

## 2018-02-06 DIAGNOSIS — N95.0 POST-MENOPAUSE BLEEDING: ICD-10-CM

## 2018-02-06 DIAGNOSIS — R63.5 RECENT WEIGHT GAIN: ICD-10-CM

## 2018-02-06 LAB — TSH SERPL DL<=0.05 MIU/L-ACNC: 1.92 UIU/ML (ref 0.36–3.74)

## 2018-02-06 PROCEDURE — 84443 ASSAY THYROID STIM HORMONE: CPT

## 2018-02-06 PROCEDURE — 36415 COLL VENOUS BLD VENIPUNCTURE: CPT

## 2018-02-12 DIAGNOSIS — Z12.9 CANCER SCREENING: Primary | ICD-10-CM

## 2018-03-26 DIAGNOSIS — R63.5 WEIGHT GAIN: ICD-10-CM

## 2018-03-26 DIAGNOSIS — R10.13 DYSPEPSIA: Primary | ICD-10-CM

## 2018-03-27 ENCOUNTER — TRANSCRIBE ORDERS (OUTPATIENT)
Dept: LAB | Facility: CLINIC | Age: 53
End: 2018-03-27

## 2018-03-27 ENCOUNTER — APPOINTMENT (OUTPATIENT)
Dept: LAB | Facility: CLINIC | Age: 53
End: 2018-03-27
Payer: COMMERCIAL

## 2018-03-27 DIAGNOSIS — R10.13 DYSPEPSIA: ICD-10-CM

## 2018-03-27 LAB
ALBUMIN SERPL BCP-MCNC: 4.3 G/DL (ref 3.5–5)
ALP SERPL-CCNC: 68 U/L (ref 46–116)
ALT SERPL W P-5'-P-CCNC: 22 U/L (ref 12–78)
ANION GAP SERPL CALCULATED.3IONS-SCNC: 6 MMOL/L (ref 4–13)
AST SERPL W P-5'-P-CCNC: 17 U/L (ref 5–45)
BILIRUB SERPL-MCNC: 0.7 MG/DL (ref 0.2–1)
BUN SERPL-MCNC: 16 MG/DL (ref 5–25)
CALCIUM SERPL-MCNC: 9.2 MG/DL (ref 8.3–10.1)
CHLORIDE SERPL-SCNC: 103 MMOL/L (ref 100–108)
CO2 SERPL-SCNC: 30 MMOL/L (ref 21–32)
CREAT SERPL-MCNC: 0.76 MG/DL (ref 0.6–1.3)
GFR SERPL CREATININE-BSD FRML MDRD: 89 ML/MIN/1.73SQ M
GLUCOSE SERPL-MCNC: 111 MG/DL (ref 65–140)
POTASSIUM SERPL-SCNC: 3.9 MMOL/L (ref 3.5–5.3)
PROT SERPL-MCNC: 7.3 G/DL (ref 6.4–8.2)
SODIUM SERPL-SCNC: 139 MMOL/L (ref 136–145)

## 2018-03-27 PROCEDURE — 86677 HELICOBACTER PYLORI ANTIBODY: CPT

## 2018-03-27 PROCEDURE — 80053 COMPREHEN METABOLIC PANEL: CPT | Performed by: INTERNAL MEDICINE

## 2018-03-27 PROCEDURE — 36415 COLL VENOUS BLD VENIPUNCTURE: CPT | Performed by: INTERNAL MEDICINE

## 2018-03-28 LAB — H PYLORI IGM SER-ACNC: <9 UNITS (ref 0–8.9)

## 2018-04-02 ENCOUNTER — TRANSCRIBE ORDERS (OUTPATIENT)
Dept: ADMINISTRATIVE | Facility: HOSPITAL | Age: 53
End: 2018-04-02

## 2018-04-02 DIAGNOSIS — Z12.39 SCREENING BREAST EXAMINATION: Primary | ICD-10-CM

## 2018-04-02 DIAGNOSIS — R10.9 ABDOMINAL PAIN, UNSPECIFIED ABDOMINAL LOCATION: Primary | ICD-10-CM

## 2018-04-04 ENCOUNTER — HOSPITAL ENCOUNTER (OUTPATIENT)
Dept: RADIOLOGY | Age: 53
Discharge: HOME | End: 2018-04-04
Payer: COMMERCIAL

## 2018-04-04 DIAGNOSIS — R10.9 ABDOMINAL PAIN, UNSPECIFIED ABDOMINAL LOCATION: ICD-10-CM

## 2018-04-04 PROCEDURE — 76700 US EXAM ABDOM COMPLETE: CPT

## 2018-04-09 ENCOUNTER — HOSPITAL ENCOUNTER (OUTPATIENT)
Dept: MAMMOGRAPHY | Facility: MEDICAL CENTER | Age: 53
Discharge: HOME | End: 2018-04-09
Payer: COMMERCIAL

## 2018-04-09 ENCOUNTER — TRANSCRIBE ORDERS (OUTPATIENT)
Dept: ADMINISTRATIVE | Age: 53
End: 2018-04-09

## 2018-04-09 ENCOUNTER — HOSPITAL ENCOUNTER (OUTPATIENT)
Dept: RADIOLOGY | Age: 53
Discharge: HOME | End: 2018-04-09
Payer: COMMERCIAL

## 2018-04-09 DIAGNOSIS — Z12.31 BREAST CANCER SCREENING BY MAMMOGRAM: ICD-10-CM

## 2018-04-09 DIAGNOSIS — Z12.9 CANCER SCREENING: ICD-10-CM

## 2018-04-09 PROCEDURE — 77063 BREAST TOMOSYNTHESIS BI: CPT

## 2018-04-09 PROCEDURE — 76856 US EXAM PELVIC COMPLETE: CPT

## 2018-04-09 PROCEDURE — 77067 SCR MAMMO BI INCL CAD: CPT

## 2018-04-09 PROCEDURE — 76830 TRANSVAGINAL US NON-OB: CPT

## 2018-05-31 DIAGNOSIS — K58.8 OTHER IRRITABLE BOWEL SYNDROME: Primary | ICD-10-CM

## 2018-07-18 DIAGNOSIS — K58.8 OTHER IRRITABLE BOWEL SYNDROME: Primary | ICD-10-CM

## 2018-08-27 ENCOUNTER — CLINICAL SUPPORT (OUTPATIENT)
Dept: FAMILY MEDICINE CLINIC | Facility: CLINIC | Age: 53
End: 2018-08-27

## 2018-08-27 DIAGNOSIS — Z00.00 HEALTHCARE MAINTENANCE: Primary | ICD-10-CM

## 2018-08-28 ENCOUNTER — APPOINTMENT (OUTPATIENT)
Dept: LAB | Facility: CLINIC | Age: 53
End: 2018-08-28

## 2018-08-28 VITALS
SYSTOLIC BLOOD PRESSURE: 128 MMHG | HEIGHT: 66 IN | WEIGHT: 184.9 LBS | BODY MASS INDEX: 29.72 KG/M2 | DIASTOLIC BLOOD PRESSURE: 82 MMHG

## 2018-08-28 DIAGNOSIS — Z00.8 HEALTH EXAMINATION IN POPULATION SURVEY: Primary | ICD-10-CM

## 2018-08-28 LAB
CHOLEST SERPL-MCNC: 191 MG/DL (ref 50–200)
EST. AVERAGE GLUCOSE BLD GHB EST-MCNC: 117 MG/DL
HBA1C MFR BLD: 5.7 % (ref 4.2–6.3)
HDLC SERPL-MCNC: 60 MG/DL (ref 40–60)
LDLC SERPL CALC-MCNC: 109 MG/DL (ref 0–100)
NONHDLC SERPL-MCNC: 131 MG/DL
TRIGL SERPL-MCNC: 112 MG/DL

## 2018-08-28 PROCEDURE — 83036 HEMOGLOBIN GLYCOSYLATED A1C: CPT

## 2018-08-28 PROCEDURE — 80061 LIPID PANEL: CPT

## 2018-08-28 PROCEDURE — 36415 COLL VENOUS BLD VENIPUNCTURE: CPT

## 2018-08-31 DIAGNOSIS — N30.00 ACUTE CYSTITIS WITHOUT HEMATURIA: Primary | ICD-10-CM

## 2018-08-31 RX ORDER — CIPROFLOXACIN 250 MG/1
500 TABLET, FILM COATED ORAL EVERY 12 HOURS SCHEDULED
Qty: 6 TABLET | Refills: 0 | Status: SHIPPED | OUTPATIENT
Start: 2018-08-31 | End: 2018-09-03

## 2018-09-05 DIAGNOSIS — J20.8 ACUTE BRONCHITIS DUE TO OTHER SPECIFIED ORGANISMS: Primary | ICD-10-CM

## 2018-09-05 RX ORDER — BENZONATATE 100 MG/1
100 CAPSULE ORAL 3 TIMES DAILY PRN
Qty: 20 CAPSULE | Refills: 0 | Status: SHIPPED | OUTPATIENT
Start: 2018-09-05 | End: 2018-12-30 | Stop reason: SDUPTHER

## 2018-10-01 DIAGNOSIS — R39.9 UTI SYMPTOMS: Primary | ICD-10-CM

## 2018-10-01 DIAGNOSIS — N39.0 URINARY TRACT INFECTION WITH HEMATURIA, SITE UNSPECIFIED: Primary | ICD-10-CM

## 2018-10-01 DIAGNOSIS — R31.9 URINARY TRACT INFECTION WITH HEMATURIA, SITE UNSPECIFIED: Primary | ICD-10-CM

## 2018-10-01 DIAGNOSIS — N30.01 ACUTE CYSTITIS WITH HEMATURIA: Primary | ICD-10-CM

## 2018-10-01 PROBLEM — N30.00 ACUTE CYSTITIS: Status: ACTIVE | Noted: 2018-10-01

## 2018-10-01 LAB
SL AMB  POCT GLUCOSE, UA: NEGATIVE
SL AMB LEUKOCYTE ESTERASE,UA: ABNORMAL
SL AMB POCT BILIRUBIN,UA: NEGATIVE
SL AMB POCT BLOOD,UA: ABNORMAL
SL AMB POCT CLARITY,UA: ABNORMAL
SL AMB POCT COLOR,UA: YELLOW
SL AMB POCT KETONES,UA: NEGATIVE
SL AMB POCT NITRITE,UA: NEGATIVE
SL AMB POCT PH,UA: 7.5
SL AMB POCT SPECIFIC GRAVITY,UA: 1.01
SL AMB POCT URINE PROTEIN: ABNORMAL
SL AMB POCT UROBILINOGEN: 0.2

## 2018-10-01 PROCEDURE — 87086 URINE CULTURE/COLONY COUNT: CPT | Performed by: INTERNAL MEDICINE

## 2018-10-01 PROCEDURE — 81002 URINALYSIS NONAUTO W/O SCOPE: CPT

## 2018-10-01 RX ORDER — SULFAMETHOXAZOLE AND TRIMETHOPRIM 800; 160 MG/1; MG/1
1 TABLET ORAL EVERY 12 HOURS SCHEDULED
Qty: 10 TABLET | Refills: 0 | Status: SHIPPED | OUTPATIENT
Start: 2018-10-01 | End: 2018-10-06

## 2018-10-02 LAB — BACTERIA UR CULT: NORMAL

## 2018-12-30 ENCOUNTER — OFFICE VISIT (OUTPATIENT)
Dept: URGENT CARE | Age: 53
End: 2018-12-30
Payer: COMMERCIAL

## 2018-12-30 VITALS
WEIGHT: 190 LBS | HEART RATE: 78 BPM | TEMPERATURE: 99.9 F | SYSTOLIC BLOOD PRESSURE: 130 MMHG | OXYGEN SATURATION: 100 % | RESPIRATION RATE: 18 BRPM | HEIGHT: 66 IN | BODY MASS INDEX: 30.53 KG/M2 | DIASTOLIC BLOOD PRESSURE: 75 MMHG

## 2018-12-30 DIAGNOSIS — B96.89 ACUTE BACTERIAL BRONCHITIS: Primary | ICD-10-CM

## 2018-12-30 DIAGNOSIS — J20.8 ACUTE BACTERIAL BRONCHITIS: Primary | ICD-10-CM

## 2018-12-30 PROCEDURE — 99213 OFFICE O/P EST LOW 20 MIN: CPT | Performed by: FAMILY MEDICINE

## 2018-12-30 PROCEDURE — S9088 SERVICES PROVIDED IN URGENT: HCPCS | Performed by: FAMILY MEDICINE

## 2018-12-30 RX ORDER — BENZONATATE 200 MG/1
200 CAPSULE ORAL 3 TIMES DAILY PRN
Qty: 20 CAPSULE | Refills: 0 | Status: SHIPPED | OUTPATIENT
Start: 2018-12-30 | End: 2019-04-22 | Stop reason: ALTCHOICE

## 2018-12-30 RX ORDER — AZITHROMYCIN 250 MG/1
TABLET, FILM COATED ORAL
Qty: 6 TABLET | Refills: 0 | Status: SHIPPED | OUTPATIENT
Start: 2018-12-30 | End: 2018-12-30 | Stop reason: SDUPTHER

## 2018-12-30 RX ORDER — ALBUTEROL SULFATE 90 UG/1
2 AEROSOL, METERED RESPIRATORY (INHALATION) EVERY 6 HOURS PRN
Qty: 1 INHALER | Refills: 0 | Status: SHIPPED | OUTPATIENT
Start: 2018-12-30 | End: 2019-04-22 | Stop reason: ALTCHOICE

## 2018-12-30 RX ORDER — BENZONATATE 200 MG/1
200 CAPSULE ORAL 3 TIMES DAILY PRN
Qty: 20 CAPSULE | Refills: 0 | Status: SHIPPED | OUTPATIENT
Start: 2018-12-30 | End: 2018-12-30 | Stop reason: SDUPTHER

## 2018-12-30 RX ORDER — AZITHROMYCIN 250 MG/1
TABLET, FILM COATED ORAL
Qty: 6 TABLET | Refills: 0 | Status: SHIPPED | OUTPATIENT
Start: 2018-12-30 | End: 2019-01-03

## 2018-12-30 RX ORDER — ALBUTEROL SULFATE 90 UG/1
2 AEROSOL, METERED RESPIRATORY (INHALATION) EVERY 6 HOURS PRN
Qty: 1 INHALER | Refills: 0 | Status: SHIPPED | OUTPATIENT
Start: 2018-12-30 | End: 2018-12-30 | Stop reason: SDUPTHER

## 2018-12-30 NOTE — PROGRESS NOTES
[employer]        NAME: Sergio Granger is a 52 y o  female  : 1965    MRN: [id number]  DATE: 2018  TIME: 4:55 PM    Assessment and Plan   Acute bacterial bronchitis [J20 8, B96 89]  1  Acute bacterial bronchitis  albuterol (PROVENTIL HFA,VENTOLIN HFA) 90 mcg/act inhaler    azithromycin (ZITHROMAX) 250 mg tablet    benzonatate (TESSALON) 200 MG capsule    DISCONTINUED: azithromycin (ZITHROMAX) 250 mg tablet    DISCONTINUED: albuterol (PROVENTIL HFA,VENTOLIN HFA) 90 mcg/act inhaler    DISCONTINUED: benzonatate (TESSALON) 200 MG capsule         Patient Instructions       Follow up with PCP in 3-5 days  Proceed to  ER if symptoms worsen  Chief Complaint     Chief Complaint   Patient presents with    Cough    Cold Like Symptoms     rt sided back pain          History of Present Illness       Patient here for evaluation of persistent cough, chest congestion, right-sided back pain from coughing          Review of Systems   Review of Systems      Current Medications       Current Outpatient Prescriptions:     albuterol (PROVENTIL HFA,VENTOLIN HFA) 90 mcg/act inhaler, Inhale 2 puffs every 6 (six) hours as needed for wheezing, Disp: 1 Inhaler, Rfl: 0    azithromycin (ZITHROMAX) 250 mg tablet, Take 2 tablets day 1 then 1 tab days 2-5, Disp: 6 tablet, Rfl: 0    benzonatate (TESSALON) 200 MG capsule, Take 1 capsule (200 mg total) by mouth 3 (three) times a day as needed for cough, Disp: 20 capsule, Rfl: 0    Current Allergies     Allergies as of 2018 - Reviewed 2018   Allergen Reaction Noted    Molds & smuts  2014            The following portions of the patient's history were reviewed and updated as appropriate: allergies, current medications, past family history, past medical history, past social history, past surgical history and problem list      Past Medical History:   Diagnosis Date    Obesity 2015       Past Surgical History:   Procedure Laterality Date  ELBOW SURGERY      GA COLONOSCOPY FLX DX W/COLLJ SPEC WHEN PFRMD N/A 6/6/2017    Procedure: COLONOSCOPY;  Surgeon: Erasmo Suazo MD;  Location: [facility]; Service: General    WISDOM TOOTH EXTRACTION         Family History   Problem Relation Age of Onset    Hypertension Mother     Stroke Mother     Diabetes Father     Other Father         Non-hodgkin's lymphoma    Coronary artery disease Brother     Diabetes Brother     Coronary artery disease Other          Medications have been verified  Objective   /75   Pulse 78   Temp 99 9 °F (37 7 °C) (Temporal)   Resp 18   Ht 5' 6" (1 676 m)   Wt 86 2 kg (190 lb)   SpO2 100%   BMI 30 67 kg/m²        Physical Exam     Physical Exam   Constitutional: She is oriented to person, place, and time  She appears well-developed and well-nourished  No distress  HENT:   Head: Normocephalic and atraumatic  Mouth/Throat: Oropharynx is clear and moist  No oropharyngeal exudate  TMs intact bilaterally with no fluid in the middle ear  No erythema bilaterally  Mild congestion bilaterally with no erythema  Eyes: Pupils are equal, round, and reactive to light  Conjunctivae and EOM are normal    Pulmonary/Chest: Effort normal  No respiratory distress  She has wheezes  She has no rales  Coarse breath sounds bilateral upper airway   Lymphadenopathy:     She has no cervical adenopathy  Neurological: She is alert and oriented to person, place, and time  Skin: Skin is warm and dry  Psychiatric: She has a normal mood and affect  Her behavior is normal    Nursing note and vitals reviewed

## 2018-12-30 NOTE — LETTER
December 30, 2018     Patient: Steph Cobian   YOB: 1965   Date of Visit: 12/30/2018       To Whom It May Concern:    Please excuse Shyann Hubbard  from work 12/31/2018 due to illness           Sincerely,        Abhilash Kerr PA-C    CC: No Recipients

## 2019-04-22 ENCOUNTER — OFFICE VISIT (OUTPATIENT)
Dept: FAMILY MEDICINE CLINIC | Facility: CLINIC | Age: 54
End: 2019-04-22
Payer: COMMERCIAL

## 2019-04-22 VITALS
HEART RATE: 71 BPM | BODY MASS INDEX: 30.37 KG/M2 | DIASTOLIC BLOOD PRESSURE: 70 MMHG | SYSTOLIC BLOOD PRESSURE: 100 MMHG | HEIGHT: 66 IN | TEMPERATURE: 98.4 F | OXYGEN SATURATION: 99 % | WEIGHT: 189 LBS

## 2019-04-22 DIAGNOSIS — Z23 NEED FOR TDAP VACCINATION: ICD-10-CM

## 2019-04-22 DIAGNOSIS — Z00.00 ENCOUNTER FOR ANNUAL PHYSICAL EXAM: Primary | ICD-10-CM

## 2019-04-22 DIAGNOSIS — E55.9 VITAMIN D DEFICIENCY: ICD-10-CM

## 2019-04-22 DIAGNOSIS — Z00.8 HEALTH EXAMINATION IN POPULATION SURVEY: ICD-10-CM

## 2019-04-22 DIAGNOSIS — Z12.39 SCREENING FOR BREAST CANCER: ICD-10-CM

## 2019-04-22 DIAGNOSIS — E66.3 OVERWEIGHT (BMI 25.0-29.9): ICD-10-CM

## 2019-04-22 PROBLEM — K57.90 DIVERTICULOSIS: Status: ACTIVE | Noted: 2019-04-22

## 2019-04-22 PROBLEM — K57.32 DIVERTICULITIS OF COLON: Status: ACTIVE | Noted: 2017-06-13

## 2019-04-22 PROBLEM — N30.00 ACUTE CYSTITIS: Status: RESOLVED | Noted: 2018-10-01 | Resolved: 2019-04-22

## 2019-04-22 PROBLEM — R63.5 WEIGHT GAIN: Status: RESOLVED | Noted: 2018-03-26 | Resolved: 2019-04-22

## 2019-04-22 PROBLEM — K57.32 DIVERTICULITIS OF COLON: Status: RESOLVED | Noted: 2017-06-13 | Resolved: 2019-04-22

## 2019-04-22 PROBLEM — B96.89 ACUTE BACTERIAL BRONCHITIS: Status: RESOLVED | Noted: 2018-12-30 | Resolved: 2019-04-22

## 2019-04-22 PROBLEM — J20.8 ACUTE BRONCHITIS DUE TO OTHER SPECIFIED ORGANISMS: Status: RESOLVED | Noted: 2018-09-05 | Resolved: 2019-04-22

## 2019-04-22 PROBLEM — J20.8 ACUTE BACTERIAL BRONCHITIS: Status: RESOLVED | Noted: 2018-12-30 | Resolved: 2019-04-22

## 2019-04-22 PROCEDURE — 99396 PREV VISIT EST AGE 40-64: CPT | Performed by: INTERNAL MEDICINE

## 2019-04-22 PROCEDURE — 90715 TDAP VACCINE 7 YRS/> IM: CPT

## 2019-04-22 PROCEDURE — 90471 IMMUNIZATION ADMIN: CPT

## 2019-04-30 DIAGNOSIS — Z00.00 ENCOUNTER FOR ANNUAL PHYSICAL EXAM: Primary | ICD-10-CM

## 2019-05-01 ENCOUNTER — OFFICE VISIT (OUTPATIENT)
Dept: BARIATRICS | Facility: CLINIC | Age: 54
End: 2019-05-01
Payer: COMMERCIAL

## 2019-05-01 VITALS
HEIGHT: 66 IN | DIASTOLIC BLOOD PRESSURE: 80 MMHG | TEMPERATURE: 97.6 F | BODY MASS INDEX: 30.53 KG/M2 | SYSTOLIC BLOOD PRESSURE: 120 MMHG | WEIGHT: 190 LBS | HEART RATE: 68 BPM | RESPIRATION RATE: 16 BRPM

## 2019-05-01 DIAGNOSIS — E55.9 VITAMIN D DEFICIENCY: ICD-10-CM

## 2019-05-01 DIAGNOSIS — R63.5 ABNORMAL WEIGHT GAIN: Primary | ICD-10-CM

## 2019-05-01 DIAGNOSIS — E66.9 CLASS 1 OBESITY: ICD-10-CM

## 2019-05-01 DIAGNOSIS — R73.01 IFG (IMPAIRED FASTING GLUCOSE): ICD-10-CM

## 2019-05-01 PROBLEM — E66.811 CLASS 1 OBESITY: Status: ACTIVE | Noted: 2017-08-16

## 2019-05-01 PROCEDURE — 99215 OFFICE O/P EST HI 40 MIN: CPT | Performed by: PHYSICIAN ASSISTANT

## 2019-06-24 ENCOUNTER — APPOINTMENT (OUTPATIENT)
Dept: LAB | Facility: CLINIC | Age: 54
End: 2019-06-24
Payer: COMMERCIAL

## 2019-06-24 DIAGNOSIS — Z00.8 HEALTH EXAMINATION IN POPULATION SURVEY: ICD-10-CM

## 2019-06-24 DIAGNOSIS — E55.9 VITAMIN D DEFICIENCY: ICD-10-CM

## 2019-06-24 DIAGNOSIS — E66.3 OVERWEIGHT (BMI 25.0-29.9): ICD-10-CM

## 2019-06-24 DIAGNOSIS — E55.9 VITAMIN D DEFICIENCY: Primary | ICD-10-CM

## 2019-06-24 LAB
25(OH)D3 SERPL-MCNC: 16 NG/ML (ref 30–100)
ALBUMIN SERPL BCP-MCNC: 4 G/DL (ref 3.5–5)
ALP SERPL-CCNC: 73 U/L (ref 46–116)
ALT SERPL W P-5'-P-CCNC: 27 U/L (ref 12–78)
ANION GAP SERPL CALCULATED.3IONS-SCNC: 4 MMOL/L (ref 4–13)
AST SERPL W P-5'-P-CCNC: 13 U/L (ref 5–45)
BASOPHILS # BLD AUTO: 0.02 THOUSANDS/ΜL (ref 0–0.1)
BASOPHILS NFR BLD AUTO: 1 % (ref 0–1)
BILIRUB SERPL-MCNC: 0.55 MG/DL (ref 0.2–1)
BUN SERPL-MCNC: 14 MG/DL (ref 5–25)
CALCIUM SERPL-MCNC: 9 MG/DL (ref 8.3–10.1)
CHLORIDE SERPL-SCNC: 109 MMOL/L (ref 100–108)
CHOLEST SERPL-MCNC: 185 MG/DL (ref 50–200)
CO2 SERPL-SCNC: 29 MMOL/L (ref 21–32)
CREAT SERPL-MCNC: 0.79 MG/DL (ref 0.6–1.3)
EOSINOPHIL # BLD AUTO: 0.13 THOUSAND/ΜL (ref 0–0.61)
EOSINOPHIL NFR BLD AUTO: 3 % (ref 0–6)
ERYTHROCYTE [DISTWIDTH] IN BLOOD BY AUTOMATED COUNT: 12.3 % (ref 11.6–15.1)
EST. AVERAGE GLUCOSE BLD GHB EST-MCNC: 123 MG/DL
GFR SERPL CREATININE-BSD FRML MDRD: 85 ML/MIN/1.73SQ M
GLUCOSE P FAST SERPL-MCNC: 94 MG/DL (ref 65–99)
HBA1C MFR BLD: 5.9 % (ref 4.2–6.3)
HCT VFR BLD AUTO: 40.5 % (ref 34.8–46.1)
HDLC SERPL-MCNC: 61 MG/DL (ref 40–60)
HGB BLD-MCNC: 13.5 G/DL (ref 11.5–15.4)
IMM GRANULOCYTES # BLD AUTO: 0.01 THOUSAND/UL (ref 0–0.2)
IMM GRANULOCYTES NFR BLD AUTO: 0 % (ref 0–2)
LDLC SERPL CALC-MCNC: 111 MG/DL (ref 0–100)
LYMPHOCYTES # BLD AUTO: 1.65 THOUSANDS/ΜL (ref 0.6–4.47)
LYMPHOCYTES NFR BLD AUTO: 41 % (ref 14–44)
MCH RBC QN AUTO: 31.8 PG (ref 26.8–34.3)
MCHC RBC AUTO-ENTMCNC: 33.3 G/DL (ref 31.4–37.4)
MCV RBC AUTO: 95 FL (ref 82–98)
MONOCYTES # BLD AUTO: 0.41 THOUSAND/ΜL (ref 0.17–1.22)
MONOCYTES NFR BLD AUTO: 10 % (ref 4–12)
NEUTROPHILS # BLD AUTO: 1.84 THOUSANDS/ΜL (ref 1.85–7.62)
NEUTS SEG NFR BLD AUTO: 45 % (ref 43–75)
NONHDLC SERPL-MCNC: 124 MG/DL
NRBC BLD AUTO-RTO: 0 /100 WBCS
PLATELET # BLD AUTO: 208 THOUSANDS/UL (ref 149–390)
PMV BLD AUTO: 11.5 FL (ref 8.9–12.7)
POTASSIUM SERPL-SCNC: 3.9 MMOL/L (ref 3.5–5.3)
PROT SERPL-MCNC: 6.6 G/DL (ref 6.4–8.2)
RBC # BLD AUTO: 4.25 MILLION/UL (ref 3.81–5.12)
SODIUM SERPL-SCNC: 142 MMOL/L (ref 136–145)
T3FREE SERPL-MCNC: 2.38 PG/ML (ref 2.3–4.2)
TRIGL SERPL-MCNC: 65 MG/DL
TSH SERPL DL<=0.05 MIU/L-ACNC: 1.69 UIU/ML (ref 0.36–3.74)
WBC # BLD AUTO: 4.06 THOUSAND/UL (ref 4.31–10.16)

## 2019-06-24 PROCEDURE — 84443 ASSAY THYROID STIM HORMONE: CPT | Performed by: INTERNAL MEDICINE

## 2019-06-24 PROCEDURE — 82306 VITAMIN D 25 HYDROXY: CPT

## 2019-06-24 PROCEDURE — 85025 COMPLETE CBC W/AUTO DIFF WBC: CPT | Performed by: INTERNAL MEDICINE

## 2019-06-24 PROCEDURE — 84481 FREE ASSAY (FT-3): CPT

## 2019-06-24 PROCEDURE — 80061 LIPID PANEL: CPT | Performed by: INTERNAL MEDICINE

## 2019-06-24 PROCEDURE — 36415 COLL VENOUS BLD VENIPUNCTURE: CPT | Performed by: INTERNAL MEDICINE

## 2019-06-24 PROCEDURE — 80053 COMPREHEN METABOLIC PANEL: CPT | Performed by: INTERNAL MEDICINE

## 2019-06-24 PROCEDURE — 83036 HEMOGLOBIN GLYCOSYLATED A1C: CPT

## 2019-06-24 RX ORDER — ERGOCALCIFEROL 1.25 MG/1
50000 CAPSULE ORAL WEEKLY
Qty: 12 CAPSULE | Refills: 0 | Status: SHIPPED | OUTPATIENT
Start: 2019-06-24 | End: 2019-12-18 | Stop reason: ALTCHOICE

## 2019-06-26 ENCOUNTER — OFFICE VISIT (OUTPATIENT)
Dept: BARIATRICS | Facility: CLINIC | Age: 54
End: 2019-06-26
Payer: COMMERCIAL

## 2019-06-26 VITALS
TEMPERATURE: 98.5 F | HEIGHT: 66 IN | WEIGHT: 191.1 LBS | HEART RATE: 71 BPM | BODY MASS INDEX: 30.71 KG/M2 | RESPIRATION RATE: 18 BRPM | SYSTOLIC BLOOD PRESSURE: 110 MMHG | DIASTOLIC BLOOD PRESSURE: 78 MMHG

## 2019-06-26 DIAGNOSIS — R63.5 ABNORMAL WEIGHT GAIN: Primary | ICD-10-CM

## 2019-06-26 DIAGNOSIS — Z79.899 MEDICATION MANAGEMENT: ICD-10-CM

## 2019-06-26 DIAGNOSIS — E66.9 CLASS 1 OBESITY: ICD-10-CM

## 2019-06-26 DIAGNOSIS — R73.01 IFG (IMPAIRED FASTING GLUCOSE): ICD-10-CM

## 2019-06-26 PROCEDURE — 99214 OFFICE O/P EST MOD 30 MIN: CPT | Performed by: PHYSICIAN ASSISTANT

## 2019-06-28 ENCOUNTER — HOSPITAL ENCOUNTER (OUTPATIENT)
Dept: RADIOLOGY | Age: 54
Discharge: HOME | End: 2019-06-28
Payer: COMMERCIAL

## 2019-06-28 ENCOUNTER — TELEPHONE (OUTPATIENT)
Dept: BARIATRICS | Facility: CLINIC | Age: 54
End: 2019-06-28

## 2019-06-28 VITALS — HEIGHT: 66 IN | BODY MASS INDEX: 30.7 KG/M2 | WEIGHT: 191 LBS

## 2019-06-28 DIAGNOSIS — E66.9 CLASS 1 OBESITY: Primary | ICD-10-CM

## 2019-06-28 DIAGNOSIS — R73.01 IFG (IMPAIRED FASTING GLUCOSE): ICD-10-CM

## 2019-06-28 DIAGNOSIS — Z12.39 SCREENING FOR BREAST CANCER: ICD-10-CM

## 2019-06-28 PROCEDURE — 77063 BREAST TOMOSYNTHESIS BI: CPT

## 2019-06-28 PROCEDURE — 77067 SCR MAMMO BI INCL CAD: CPT

## 2019-07-05 ENCOUNTER — TELEPHONE (OUTPATIENT)
Dept: BARIATRICS | Facility: CLINIC | Age: 54
End: 2019-07-05

## 2019-07-05 NOTE — TELEPHONE ENCOUNTER
Pt called the office in regards to the pt message you sent her in reference to hx of Seizure or Glaucoma? Pt stated that she does not have a hx of Seizure or Glaucoma but carries the gene for Macular Degeneration

## 2019-07-09 ENCOUNTER — OFFICE VISIT (OUTPATIENT)
Dept: BARIATRICS | Facility: CLINIC | Age: 54
End: 2019-07-09

## 2019-07-09 DIAGNOSIS — R63.5 ABNORMAL WEIGHT GAIN: Primary | ICD-10-CM

## 2019-07-09 PROCEDURE — RECHECK: Performed by: DIETITIAN, REGISTERED

## 2019-07-09 PROCEDURE — WMDI30: Performed by: DIETITIAN, REGISTERED

## 2019-07-09 NOTE — PROGRESS NOTES
Weight Management Medical Nutrition Assessment  Patient presents for meal planning session  Excess calories appear to be coming from portions  Discussed more consistency with food journaling  Anthropometric Measurements  Start Weight (lbs): 189 4lbs   Current Weight (lbs): 189 4  TBW % Change from start weight:n/a  Ideal Body Weight (lbs):130lbs (59kg)  Goal Weight (lbs):    Weight Loss History  Previous weight loss attempts: Counseling with  MD  Nutrition Counseling with RD, Healthy Core program    Food and Nutrition Related History  Wake up: 5:30-6am   Bed Time:     Food Recall  Leaves for work &:30-8am, 30-45min commute, works long days  10am Breakfast: [employer] meal replacement, fruit  Snack:  Pretzel stick or nuts, berries  1pm Lunch:salad (spinach or spring mix, lunch meat/cheese rollup or chicken/salmon, olives, feta cheese, tomatoes- garlic expressions vinegarette or light ranch/honey mustard, crutons) or [employer] apple pecan  Snack:picking on her salad  7:30pm Dinner: fish, green beans  Snack:wine- 2 glasses (2-3 nights a week when  is home), salty snack-torilla chips    Beverages: water and propel  Volume of beverage intake: 32 oz water    Weekends: Worse, goes out on her boat  Cravings: salty  Trouble area of day:evening    Frequency of Eating out: biweekly  Food restrictions:none  Cooking: self ,   Food Shopping: self    Physical Activity Intake  Activity:works with a - will start 2x/week in August, currently nothing  Frequency:rarely  Physical limitations/barriers to exercise: none    Estimated Needs  Energy  [employer] Needs: BMR : 4815   1-2# loss weekly sedentary:  810-5509             1-2# loss weekly lightly active:7514-1212  Protein:71-89      (1 2-1 5g/kg IBW)  Fluid: 69oz     (35mL/kg IBW)    Nutrition Diagnosis  Yes;     Overweight/obesity  related to Excess energy intake as evidenced by  BMI more than normative standard for age and sex (obesity-grade I 30-34  9)       Nutrition Intervention    Nutrition Prescription  Calories:7977-0058  Protein:70-90  Fluid:70oz    Meal Plan  Breakfast: 200/27  Snack:150/10  Lunch:300/21  Snack 150/10  Dinner: 250/25  Snack:n/a    Nutrition Education:    Healthy Core Manual  Calorie controlled menu  Lean protein food choices  Healthy snack options  Food journaling tips      Nutrition Counseling:  Strategies: meal planning, portion sizes, healthy snack choices, hydration, fiber intake, protein intake, exercise, food journal      Monitoring and Evaluation:  Evaluation criteria:  Energy Intake  Meet protein needs  Maintain adequate hydration  Monitor weekly weight   Decreased portions at mealtimes and snacks  Physical activity     Barriers to learning:none  Readiness to change: Preparation  Comprehension: very good  Expected Compliance: very good

## 2019-07-09 NOTE — TELEPHONE ENCOUNTER
Pt saw RD and stopped the Qsymia due to below  Please reach out to the pt and let her know there is no listed contra-indication for qsymia and macular degeneration  However, she can discuss with her ophthalmologist before continuing it  Once she hears from them please have her notify me of their recommendations  Thank you!

## 2019-07-18 ENCOUNTER — TELEPHONE (OUTPATIENT)
Dept: BARIATRICS | Facility: CLINIC | Age: 54
End: 2019-07-18

## 2019-07-18 NOTE — TELEPHONE ENCOUNTER
Pt stated she has not contacted Ophthalmologist and is no longer interested in taking weight loss medication

## 2019-07-18 NOTE — TELEPHONE ENCOUNTER
----- Message from Zach Duque PA-C sent at 7/17/2019  8:29 AM EDT -----  Regarding: RE: medication  Please call the pt and see if she was able to reach out to ophthalmology regarding whether she can use the phentermine? Was she interested in something else? I believe we mentioned Saxenda? Thank  Geo Escobar  ----- Message -----  From: Violet Siddiqi RD  Sent: 7/9/2019   2:05 PM EDT  To: Zach Duque PA-C  Subject: medication                                       Angie Pastor was in today  She stopped the Qsymia because she did not hear back regarding the reported family history of /macular degeneration  She asked that someone please contact her if it is ok to return to taking Qsymia      Thank you  Nicole Vora

## 2019-07-18 NOTE — TELEPHONE ENCOUNTER
Pt stated that she has not contacted an Ophthalmologist and is no longer interested in taking any weight loss medication

## 2019-07-18 NOTE — TELEPHONE ENCOUNTER
----- Message from Cynthia Telles PA-C sent at 7/17/2019  8:29 AM EDT -----  Regarding: RE: medication  Please call the pt and see if she was able to reach out to ophthalmology regarding whether she can use the phentermine? Was she interested in something else? I believe we mentioned Saxenda? Thank  Dennis Ramires  ----- Message -----  From: Joellen Newberry RD  Sent: 7/9/2019   2:05 PM EDT  To: Cynthia Telles PA-C  Subject: medication                                       Colonel Russ was in today  She stopped the Qsymia because she did not hear back regarding the reported family history of /macular degeneration  She asked that someone please contact her if it is ok to return to taking Qsymia      Thank you  Vania Vasquez

## 2019-08-14 ENCOUNTER — OFFICE VISIT (OUTPATIENT)
Dept: BARIATRICS | Facility: CLINIC | Age: 54
End: 2019-08-14
Payer: COMMERCIAL

## 2019-08-14 VITALS
HEART RATE: 65 BPM | HEIGHT: 66 IN | DIASTOLIC BLOOD PRESSURE: 80 MMHG | BODY MASS INDEX: 30.47 KG/M2 | RESPIRATION RATE: 18 BRPM | SYSTOLIC BLOOD PRESSURE: 110 MMHG | TEMPERATURE: 98.2 F | WEIGHT: 189.6 LBS

## 2019-08-14 DIAGNOSIS — E66.9 CLASS 1 OBESITY: ICD-10-CM

## 2019-08-14 DIAGNOSIS — R63.5 ABNORMAL WEIGHT GAIN: Primary | ICD-10-CM

## 2019-08-14 DIAGNOSIS — R73.01 IFG (IMPAIRED FASTING GLUCOSE): ICD-10-CM

## 2019-08-14 PROCEDURE — 99214 OFFICE O/P EST MOD 30 MIN: CPT | Performed by: PHYSICIAN ASSISTANT

## 2019-08-14 NOTE — PROGRESS NOTES
Assessment/Plan:    Class 1 obesity  -Patient is pursuing the conservative program  -Initial weight loss goal of 5-10% weight loss for improved health  -Avoid Contrave- had adverse effect on mood  -Tried phentermine, but stopped because she reports she carries the gene for macular degeneration  Now interested in [facility] (start weight 189 6 lbs) Denies family and personal hx of MEN2 and MCT tumors, and medullary thyroid carcinoma  Denies personal hx of pancreatitis  SE profile reviewed  Reviewed titration and injection instructions  Change needle with each use  Clean area with an alcohol swab prior to injecting    -Reviewed the importance of adequate sleep on weight loss  -Discussed time restricted vs alternate day fasting  She is interested in trying time restricted eating    -Excited for her first granchild, a boy, who should arrive any day       Initial: 182 (8/16/17)  Initial: 190 (5/1/19)  Current:189 6 (-2 4 lbs since 6/26/19)  Change: -0 4 lb  Goal:    IFG (impaired fasting glucose)  -A1c 5 9 %  -start Saxenda    Colonoscopy: up to date- last year    Follow up in approximately 6 weeks with Non-Surgical Physician/Advanced Practitioner  25 minute visit, >50% time spent counseling patient on diet behavior and exercise modification for weight loss  Goals:  Food log (ie ) www myfitnesspal com,sparkpeople  com,loseit com,calorieking  com,etc  baritastic  No sugary beverages  At least 64oz of water daily  Increase physical activity by 10 minutes daily  Gradually increase physical activity to a goal of 5 days per week for 30 minutes of MODERATE intensity PLUS 2 days per week of FULL BODY resistance training  5-10 servings of fruits and vegetables per day and 25-35 grams of dietary fiber per day, gradually increasing    VISIT SAXENDA  COM  INJECT SAXENDA DAILY SUBCUTANEOUSLY    -WEEK 1: 0 6mg daily  -if tolerated WEEK 2: 1 2mg daily  -if tolerated WEEK 3: 1 8mg daily  -if tolerated WEEK 4: 2 4mg daily  -if tolerated WEEK 5: 3mg daily  -IF NAUSEA/VOMITING DEVELOP STOP MEDICATION FOR A FEW DAYS AND DECREASE TO PREVIOUSLY TOLERATED DOSE  STAY HYDRATED  -IF YOU DEVELOP SEVERE ABDOMINAL PAIN WHICH MAY RADIATE TO THE BACK, SOMETIMES ASSOCIATED WITH FEVER, AND VOMITING, STOP MEDICATION AND SEEK URGENT CARE AS THIS MAY BE A SIGN OF PANCREATITIS  Diagnoses and all orders for this visit:    Abnormal weight gain  -     liraglutide (SAXENDA) injection; Inject 0 6 mg subcutaneously once per day for the first week  Increase in weekly intervals by 0 6 mg until a dose of 3 mg is reached  -     Insulin Pen Needle (NOVOFINE PLUS) 32G X 4 MM MISC; by Does not apply route daily    Class 1 obesity  -     liraglutide (SAXENDA) injection; Inject 0 6 mg subcutaneously once per day for the first week  Increase in weekly intervals by 0 6 mg until a dose of 3 mg is reached  -     Insulin Pen Needle (NOVOFINE PLUS) 32G X 4 MM MISC; by Does not apply route daily    Body mass index 30 0-30 9, adult  -     liraglutide (SAXENDA) injection; Inject 0 6 mg subcutaneously once per day for the first week  Increase in weekly intervals by 0 6 mg until a dose of 3 mg is reached  -     Insulin Pen Needle (NOVOFINE PLUS) 32G X 4 MM MISC; by Does not apply route daily    IFG (impaired fasting glucose)  -     liraglutide (SAXENDA) injection; Inject 0 6 mg subcutaneously once per day for the first week  Increase in weekly intervals by 0 6 mg until a dose of 3 mg is reached  -     Insulin Pen Needle (NOVOFINE PLUS) 32G X 4 MM MISC; by Does not apply route daily    Subjective:   Chief Complaint   Patient presents with    Follow-up     mwm fu     Patient ID: Mark Dawn  is a 52 y o  female with excess weight/obesity here to pursue weight management  Patient is pursuing Conservative Program      HPI  The patient presents for MWM follow up  Staying consistent with lifestyle changes and exercise    Sleep: disturbed due to hot flashes, 5-6 hours    The following portions of the patient's history were reviewed and updated as appropriate: allergies, current medications, past family history, past medical history, past social history, past surgical history and problem list     Review of Systems   Respiratory: Negative  Cardiovascular: Negative  Gastrointestinal: Negative  Psychiatric/Behavioral: Negative  Objective:    /80 (BP Location: Left arm, Patient Position: Sitting, Cuff Size: Large)   Pulse 65   Temp 98 2 °F (36 8 °C) (Tympanic)   Resp 18   Ht 5' 6" (1 676 m)   Wt 86 kg (189 lb 9 6 oz)   BMI 30 60 kg/m²      Physical Exam   Nursing note and vitals reviewed  Constitutional   General appearance: Abnormal   well developed and obese  Pulmonary   Respiratory effort: No increased work of breathing or signs of respiratory distress  Abdomen   Abdomen: Abnormal   The abdomen was obese     Musculoskeletal   Gait and station: Normal     Psychiatric   Orientation to person, place and time: Normal     Affect: appropriate

## 2019-08-14 NOTE — ASSESSMENT & PLAN NOTE
-Patient is pursuing the conservative program  -Initial weight loss goal of 5-10% weight loss for improved health  -Avoid Contrave- had adverse effect on mood  -Tried phentermine, but stopped because she reports she carries the gene for macular degeneration  Now interested in [facility] (start weight 189 6 lbs) Denies family and personal hx of MEN2 and MCT tumors, and medullary thyroid carcinoma  Denies personal hx of pancreatitis  SE profile reviewed  Reviewed titration and injection instructions  Change needle with each use  Clean area with an alcohol swab prior to injecting    -Reviewed the importance of adequate sleep on weight loss  -Discussed time restricted vs alternate day fasting   She is interested in trying time restricted eating    -Excited for her first granchild, a boy, who should arrive any day       Initial: 182 (8/16/17)  Initial: 190 (5/1/19)  Current:189 6 (-2 4 lbs since 6/26/19)  Change: -0 4 lb  Goal:

## 2019-08-14 NOTE — PATIENT INSTRUCTIONS
Goals: Food log (ie ) www myfitnesspal com,sparkpeople  com,loseit com,calorieking  com,etc  baritastic  No sugary beverages  At least 64oz of water daily  Increase physical activity by 10 minutes daily  Gradually increase physical activity to a goal of 5 days per week for 30 minutes of MODERATE intensity PLUS 2 days per week of FULL BODY resistance training  5-10 servings of fruits and vegetables per day and 25-35 grams of dietary fiber per day, gradually increasing    VISIT SAXENDA  COM  INJECT SAXENDA DAILY SUBCUTANEOUSLY  -WEEK 1: 0 6mg daily  -if tolerated WEEK 2: 1 2mg daily  -if tolerated WEEK 3: 1 8mg daily  -if tolerated WEEK 4: 2 4mg daily  -if tolerated WEEK 5: 3mg daily  -IF NAUSEA/VOMITING DEVELOP STOP MEDICATION FOR A FEW DAYS AND DECREASE TO PREVIOUSLY TOLERATED DOSE  STAY HYDRATED  -IF YOU DEVELOP SEVERE ABDOMINAL PAIN WHICH MAY RADIATE TO THE BACK, SOMETIMES ASSOCIATED WITH FEVER, AND VOMITING, STOP MEDICATION AND SEEK URGENT CARE AS THIS MAY BE A SIGN OF PANCREATITIS

## 2019-09-03 DIAGNOSIS — N39.0 URINARY TRACT INFECTION WITHOUT HEMATURIA, SITE UNSPECIFIED: Primary | ICD-10-CM

## 2019-09-03 RX ORDER — SULFAMETHOXAZOLE AND TRIMETHOPRIM 800; 160 MG/1; MG/1
1 TABLET ORAL EVERY 12 HOURS SCHEDULED
Qty: 10 TABLET | Refills: 0 | Status: SHIPPED | OUTPATIENT
Start: 2019-09-03 | End: 2019-09-08

## 2019-09-23 NOTE — PROGRESS NOTES
Assessment/Plan:    Overweight  -Patient is pursuing the conservative program  -Initial weight loss goal of 5-10% weight loss for improved health  -Avoid Contrave- had adverse effect on mood  -Tried phentermine, but stopped because she reports she carries the gene for macular degeneration    -started on Saxenda (start weight 189 6 lbs) Denies family and personal hx of MEN2 and medullary thyroid/thyroid carcinoma  Denies personal hx of pancreatitis  She plans to gradually titrate  -BP/P stable  -Encouraged working on hydration  Recommend returning to exercise when things slow down  Class 1 Obesity --> Overweight  Initial: 182 (8/16/17)  Initial at restart: 190 (5/1/19)  Current:181 9 lbs (-7 7 lbs since 8/14/19)  Change: -8 1 lb since highest weight  Goal: LTG under 160,     IFG (impaired fasting glucose)  -A1c 5 9 % --> reports she had a fingerstick A1c which was 5 5 %  -started Saxenda    Follow up in approximately 6 weeks with Non-Surgical Physician/Advanced Practitioner  25 minute visit, >50% time spent counseling patient on diet behavior and exercise modification for weight loss  Colonoscopy-6/6/17    Goals:  Food log (ie ) www myfitnesspal com,sparkpeople  com,loseit com,calorieking  com,etc  baritastic  No sugary beverages  At least 64oz of water daily  Increase physical activity by 10 minutes daily  Gradually increase physical activity to a goal of 5 days per week for 30 minutes of MODERATE intensity PLUS 2 days per week of FULL BODY resistance training  5-10 servings of fruits and vegetables per day and 25-35 grams of dietary fiber per day, gradually increasing     Diagnoses and all orders for this visit:    Overweight    IFG (impaired fasting glucose)    Body mass index 29 0-29 9, adult        Subjective:   Chief Complaint   Patient presents with    Follow-up     6wk fu     Patient ID: Mark Dawn  is a 62 y o  female with excess weight/obesity here to pursue weight management  Patient is pursuing Conservative Program      HPI  The patient presents for [facility] follow up  Saxenda: stayed on 0 6 mg for four weeks as she felt positive changes  Now on 1 2 mg  Noticing improvement in appetite and reduction in snacking/grazing  SE: feels lightheaded if she does not eat in the morning (injects between 5-8 p)  Only feels nauseous if she doesn't't eat  Food logging: Intermittent- usually around 6662-9747, but practicing interval eating  B: shake + banana  S: not usually  L: salad, soup, or smart one  S: apple + cheese  D: smart one or protein + vegetable  S: usually not    Fruit/Vegetable servings: 3  Exercise: no, as she has been caring for her mom who had a recent fall  Hydration: not reaching water goals    The following portions of the patient's history were reviewed and updated as appropriate: allergies, current medications, past family history, past medical history, past social history, past surgical history and problem list     Review of Systems   Respiratory: Negative  Cardiovascular: Negative  Gastrointestinal: Negative  Psychiatric/Behavioral: Negative  Objective:    /64 (BP Location: Left arm, Patient Position: Sitting, Cuff Size: Large)   Pulse 78   Temp 97 6 °F (36 4 °C) (Tympanic)   Resp 18   Ht 5' 6" (1 676 m)   Wt 82 5 kg (181 lb 14 4 oz)   BMI 29 36 kg/m²      Physical Exam   Nursing note and vitals reviewed  Constitutional   General appearance: Abnormal   well developed and overweight  Eyes No conjunctival pallor  Ears, Nose, Mouth, and Throat Oral mucosa moist    Pulmonary   Respiratory effort: No increased work of breathing or signs of respiratory distress  Auscultation of lungs: Clear to auscultation, equal breath sounds bilaterally, no wheezes, no rales, no rhonci  Cardiovascular   Auscultation of heart: Normal rate and rhythm, normal S1 and S2, without murmurs      Examination of extremities for edema and/or varicosities: Normal   no edema  Abdomen   Abdomen: Bowel sounds were normal  The abdomen was soft and nontender     Musculoskeletal   Gait and station: Normal     Psychiatric   Orientation to person, place and time: Normal     Affect: appropriate

## 2019-09-23 NOTE — ASSESSMENT & PLAN NOTE
-Patient is pursuing the conservative program  -Initial weight loss goal of 5-10% weight loss for improved health  -Avoid Contrave- had adverse effect on mood  -Tried phentermine, but stopped because she reports she carries the gene for macular degeneration    -started on Saxenda (start weight 189 6 lbs) Denies family and personal hx of MEN2 and medullary thyroid/thyroid carcinoma  Denies personal hx of pancreatitis  She plans to gradually titrate  -BP/P stable  -Encouraged working on hydration  Recommend returning to exercise when things slow down       Class 1 Obesity --> Overweight  Initial: 182 (8/16/17)  Initial at restart: 190 (5/1/19)  Current:181 9 lbs (-7 7 lbs since 8/14/19)  Change: -8 1 lb since highest weight  Goal: LTG under 160,

## 2019-09-27 ENCOUNTER — OFFICE VISIT (OUTPATIENT)
Dept: BARIATRICS | Facility: CLINIC | Age: 54
End: 2019-09-27
Payer: COMMERCIAL

## 2019-09-27 VITALS
WEIGHT: 181.9 LBS | BODY MASS INDEX: 29.23 KG/M2 | SYSTOLIC BLOOD PRESSURE: 108 MMHG | DIASTOLIC BLOOD PRESSURE: 64 MMHG | HEART RATE: 78 BPM | RESPIRATION RATE: 18 BRPM | TEMPERATURE: 97.6 F | HEIGHT: 66 IN

## 2019-09-27 DIAGNOSIS — E66.3 OVERWEIGHT: Primary | ICD-10-CM

## 2019-09-27 DIAGNOSIS — R73.01 IFG (IMPAIRED FASTING GLUCOSE): ICD-10-CM

## 2019-09-27 PROCEDURE — 99214 OFFICE O/P EST MOD 30 MIN: CPT | Performed by: PHYSICIAN ASSISTANT

## 2019-10-31 DIAGNOSIS — R63.5 ABNORMAL WEIGHT GAIN: ICD-10-CM

## 2019-10-31 DIAGNOSIS — R73.01 IFG (IMPAIRED FASTING GLUCOSE): ICD-10-CM

## 2019-10-31 DIAGNOSIS — E66.9 CLASS 1 OBESITY: ICD-10-CM

## 2019-11-01 DIAGNOSIS — E66.9 CLASS 1 OBESITY: ICD-10-CM

## 2019-11-01 DIAGNOSIS — R63.5 ABNORMAL WEIGHT GAIN: ICD-10-CM

## 2019-11-01 DIAGNOSIS — R73.01 IFG (IMPAIRED FASTING GLUCOSE): ICD-10-CM

## 2019-11-04 RX ORDER — PEN NEEDLE, DIABETIC 32GX 5/32"
NEEDLE, DISPOSABLE MISCELLANEOUS
Qty: 100 EACH | Refills: 0 | Status: SHIPPED | OUTPATIENT
Start: 2019-11-04 | End: 2020-02-06 | Stop reason: SDUPTHER

## 2019-11-13 NOTE — PROGRESS NOTES
Assessment/Plan:    Overweight  -Patient is pursuing the conservative program  -Initial weight loss goal of 5-10% weight loss for improved health-met  -Avoid Contrave- had adverse effect on mood  -Tried phentermine, but stopped because she reports she carries the gene for macular degeneration    -started on Saxenda (start weight 189 6 lbs) Denied family and personal hx of MEN2 and medullary thyroid/thyroid carcinoma  Denied personal hx of pancreatitis  -Exercise limited due to death in the family and mother in and out of the hospital  Hopes to restart  -Reviewed importance of hydration/water and food logging       Class 1 Obesity --> Overweight  Initial: 182 (8/16/17)  Initial at restart: 190 (5/1/19)  Current: 175 7 (-6 2 lbs since 9/27/19)  Change: -14 3 lbs  Goal: LTG under 160,     IFG (impaired fasting glucose)  -A1c 5 9 % --> reports she had a fingerstick A1c which was 5 5 %  -on Saxenda    Follow up in approximately 6-8 weeks with Non-Surgical Physician/Advanced Practitioner  15 minute visit, >50% time spent counseling patient on diet behavior and exercise modification for weight loss  Colonoscopy-6/6/17, per pt recall is 10 years    Goals:  Food log (ie ) www myfitnesspal com,sparkpeople  com,loseit com,calorieking  com,etc  baritastic  No sugary beverages  At least 64oz of water daily  Increase physical activity by 10 minutes daily   Gradually increase physical activity to a goal of 5 days per week for 30 minutes of MODERATE intensity PLUS 2 days per week of FULL BODY resistance training  5-10 servings of fruits and vegetables per day and 25-35 grams of dietary fiber per day, gradually increasing  8692-6390 calories + 100-150 calories on days of exercise     Diagnoses and all orders for this visit:    Overweight    IFG (impaired fasting glucose)    Body mass index 28 0-28 9, adult        Subjective:   Chief Complaint   Patient presents with    Follow-up     6 week mwm follow up      Patient ID: Carli Gonzalez  is a 62 y o  female with excess weight/obesity here to pursue weight management  Patient is pursuing Conservative Program      HPI  The patient presents for [facility] follow up  Saxenda:  On 2 4 mg , tolerating without side effects w/o side effects other than nausea with she had ABX and after her flu shot  Able to keep portions smaller    Food logging: no, but eating similarly each day  Increased appetite/cravings: denies  Fruit/Vegetable servings: 2-3 fruits, 2-3 vegetables  Exercise: no  Hydration: 24 oz of water, 2 cups coffee with creamer + occasional diet soda    The following portions of the patient's history were reviewed and updated as appropriate: allergies, current medications, past family history, past medical history, past social history, past surgical history and problem list     Review of Systems   Respiratory: Negative  Cardiovascular: Negative  Gastrointestinal: Negative  Psychiatric/Behavioral:        Denies concerns with mood     Objective:    /70 (BP Location: Left arm, Patient Position: Sitting, Cuff Size: Adult)   Pulse 76   Temp 97 8 °F (36 6 °C) (Tympanic)   Resp 16   Ht 5' 6" (1 676 m)   Wt 79 7 kg (175 lb 11 2 oz)   BMI 28 36 kg/m²      Physical Exam   Nursing note and vitals reviewed  Constitutional   General appearance: Abnormal   well developed and overweight  Pulmonary   Respiratory effort: No increased work of breathing or signs of respiratory distress      Musculoskeletal   Gait and station: Normal     Psychiatric   Orientation to person, place and time: Normal     Affect: appropriate

## 2019-11-13 NOTE — ASSESSMENT & PLAN NOTE
-Patient is pursuing the conservative program  -Initial weight loss goal of 5-10% weight loss for improved health-met  -Avoid Contrave- had adverse effect on mood  -Tried phentermine, but stopped because she reports she carries the gene for macular degeneration    -started on Saxenda (start weight 189 6 lbs) Denied family and personal hx of MEN2 and medullary thyroid/thyroid carcinoma  Denied personal hx of pancreatitis     -Exercise limited due to death in the family and mother in and out of the hospital  Hopes to restart  -Reviewed importance of hydration/water and food logging       Class 1 Obesity --> Overweight  Initial: 182 (8/16/17)  Initial at restart: 190 (5/1/19)  Current: 175 7 (-6 2 lbs since 9/27/19)  Change: -14 3 lbs  Goal: LTG under 160,

## 2019-11-18 ENCOUNTER — OFFICE VISIT (OUTPATIENT)
Dept: BARIATRICS | Facility: CLINIC | Age: 54
End: 2019-11-18
Payer: COMMERCIAL

## 2019-11-18 VITALS
TEMPERATURE: 97.8 F | RESPIRATION RATE: 16 BRPM | HEIGHT: 66 IN | SYSTOLIC BLOOD PRESSURE: 120 MMHG | WEIGHT: 175.7 LBS | BODY MASS INDEX: 28.24 KG/M2 | DIASTOLIC BLOOD PRESSURE: 70 MMHG | HEART RATE: 76 BPM

## 2019-11-18 DIAGNOSIS — E66.3 OVERWEIGHT: Primary | ICD-10-CM

## 2019-11-18 DIAGNOSIS — R73.01 IFG (IMPAIRED FASTING GLUCOSE): ICD-10-CM

## 2019-11-18 PROCEDURE — 99213 OFFICE O/P EST LOW 20 MIN: CPT | Performed by: PHYSICIAN ASSISTANT

## 2019-11-18 NOTE — PATIENT INSTRUCTIONS
Goals: Food log (ie ) www myfitnesspal com,sparkpeople  com,loseit com,calorieking  com,etc  baritastic  No sugary beverages  At least 64oz of water daily  Increase physical activity by 10 minutes daily   Gradually increase physical activity to a goal of 5 days per week for 30 minutes of MODERATE intensity PLUS 2 days per week of FULL BODY resistance training  5-10 servings of fruits and vegetables per day and 25-35 grams of dietary fiber per day, gradually increasing  9618-7770 calories + 100-150 calories on days of exercise

## 2019-12-10 DIAGNOSIS — R63.5 ABNORMAL WEIGHT GAIN: ICD-10-CM

## 2019-12-10 DIAGNOSIS — E66.9 CLASS 1 OBESITY: ICD-10-CM

## 2019-12-10 DIAGNOSIS — R73.01 IFG (IMPAIRED FASTING GLUCOSE): ICD-10-CM

## 2019-12-10 NOTE — TELEPHONE ENCOUNTER
Pharmacy request  Requested Prescriptions     Pending Prescriptions Disp Refills    liraglutide (SAXENDA) injection 15 mL 0     Sig: Inject 2 4 mg subcutaneously once per day for the first week   Increase in weekly intervals by 0 6 mg until a dose of 3 mg is reached

## 2019-12-18 ENCOUNTER — OFFICE VISIT (OUTPATIENT)
Dept: FAMILY MEDICINE CLINIC | Facility: CLINIC | Age: 54
End: 2019-12-18
Payer: COMMERCIAL

## 2019-12-18 VITALS
OXYGEN SATURATION: 98 % | TEMPERATURE: 97.4 F | SYSTOLIC BLOOD PRESSURE: 122 MMHG | RESPIRATION RATE: 18 BRPM | HEART RATE: 64 BPM | DIASTOLIC BLOOD PRESSURE: 74 MMHG

## 2019-12-18 DIAGNOSIS — N39.0 URINARY TRACT INFECTION WITHOUT HEMATURIA, SITE UNSPECIFIED: Primary | ICD-10-CM

## 2019-12-18 DIAGNOSIS — E55.9 VITAMIN D DEFICIENCY: ICD-10-CM

## 2019-12-18 PROCEDURE — 99213 OFFICE O/P EST LOW 20 MIN: CPT | Performed by: INTERNAL MEDICINE

## 2019-12-18 PROCEDURE — 1036F TOBACCO NON-USER: CPT | Performed by: INTERNAL MEDICINE

## 2019-12-18 RX ORDER — CIPROFLOXACIN 250 MG/1
250 TABLET, FILM COATED ORAL EVERY 12 HOURS SCHEDULED
Qty: 20 TABLET | Refills: 0 | Status: SHIPPED | OUTPATIENT
Start: 2019-12-18 | End: 2019-12-28

## 2019-12-18 NOTE — PROGRESS NOTES
Assessment/Plan:   Diagnoses and all orders for this visit:    Urinary tract infection without hematuria, site unspecified  -     ciprofloxacin (CIPRO) 250 mg tablet; Take 1 tablet (250 mg total) by mouth every 12 (twelve) hours for 10 days    Vitamin D deficiency     Evelyn was seen and examined in the office today  Did not do a urine dip given the treatment she took and was unable to give a sample  Did have Familia Gaxiola in the urine in the past  Was given Cipro script with extra pills secondary to her upcoming trip  Advised to start Vitamin d 5000 IU daily  Subjective:      Patient ID: Martin Salazar is a 62 y o  female  Nonnie Bright is here today with urinary complaints that started Monday  Reports noticing pelvic pain/discomfort and dysuria towards the end of the pregnancy  She took Azo and did have a dose of cipro thus far  Denies any other complaints on ROS  The following portions of the patient's history were reviewed and updated as appropriate: allergies, current medications, past family history, past medical history, past social history, past surgical history and problem list     Review of Systems   Constitutional: Negative for chills, fever and unexpected weight change  Respiratory: Negative for cough and chest tightness  Cardiovascular: Negative for chest pain and palpitations  Gastrointestinal: Negative for abdominal pain, diarrhea, nausea and vomiting  Genitourinary: Positive for dysuria and urgency  Negative for difficulty urinating  Musculoskeletal: Positive for arthralgias  Neurological: Negative for dizziness, numbness and headaches  Psychiatric/Behavioral: Negative for sleep disturbance  The patient is not nervous/anxious  Objective:      /74   Pulse 64   Temp (!) 97 4 °F (36 3 °C)   Resp 18   SpO2 98%          Physical Exam   Constitutional: She is oriented to person, place, and time  She appears well-developed and well-nourished  No distress     HENT:   Head: Normocephalic and atraumatic  Eyes: Conjunctivae and EOM are normal  Right eye exhibits no discharge  Left eye exhibits no discharge  No scleral icterus  Neck: Normal range of motion  Cardiovascular: Normal rate, regular rhythm and normal heart sounds  No murmur heard  Pulmonary/Chest: Effort normal and breath sounds normal  No respiratory distress  She has no wheezes  Abdominal: Soft  Bowel sounds are normal  She exhibits no distension  There is no tenderness  Musculoskeletal: Normal range of motion  Neurological: She is alert and oriented to person, place, and time  Skin: Skin is warm and dry  She is not diaphoretic  Psychiatric: She has a normal mood and affect  Her speech is normal and behavior is normal  Judgment and thought content normal    Vitals reviewed

## 2020-01-20 ENCOUNTER — OFFICE VISIT (OUTPATIENT)
Dept: FAMILY MEDICINE CLINIC | Facility: CLINIC | Age: 55
End: 2020-01-20
Payer: COMMERCIAL

## 2020-01-20 VITALS
HEART RATE: 81 BPM | OXYGEN SATURATION: 97 % | TEMPERATURE: 99.1 F | BODY MASS INDEX: 27.45 KG/M2 | HEIGHT: 66 IN | SYSTOLIC BLOOD PRESSURE: 120 MMHG | WEIGHT: 170.8 LBS | DIASTOLIC BLOOD PRESSURE: 78 MMHG

## 2020-01-20 DIAGNOSIS — J01.90 ACUTE SINUSITIS, RECURRENCE NOT SPECIFIED, UNSPECIFIED LOCATION: Primary | ICD-10-CM

## 2020-01-20 PROCEDURE — 99213 OFFICE O/P EST LOW 20 MIN: CPT | Performed by: INTERNAL MEDICINE

## 2020-01-20 RX ORDER — ALBUTEROL SULFATE 90 UG/1
2 AEROSOL, METERED RESPIRATORY (INHALATION) EVERY 6 HOURS PRN
Qty: 18 G | Refills: 0 | Status: SHIPPED | OUTPATIENT
Start: 2020-01-20 | End: 2020-01-20 | Stop reason: SDUPTHER

## 2020-01-20 RX ORDER — AMOXICILLIN 875 MG/1
875 TABLET, COATED ORAL 2 TIMES DAILY
Qty: 20 TABLET | Refills: 0 | Status: SHIPPED | OUTPATIENT
Start: 2020-01-20 | End: 2020-01-20 | Stop reason: SDUPTHER

## 2020-01-20 RX ORDER — AMOXICILLIN 875 MG/1
875 TABLET, COATED ORAL 2 TIMES DAILY
Qty: 20 TABLET | Refills: 0 | Status: SHIPPED | OUTPATIENT
Start: 2020-01-20 | End: 2020-01-30

## 2020-01-20 RX ORDER — BENZONATATE 200 MG/1
200 CAPSULE ORAL 3 TIMES DAILY PRN
Qty: 20 CAPSULE | Refills: 0 | Status: SHIPPED | OUTPATIENT
Start: 2020-01-20 | End: 2020-01-20 | Stop reason: SDUPTHER

## 2020-01-20 RX ORDER — BENZONATATE 200 MG/1
200 CAPSULE ORAL 3 TIMES DAILY PRN
Qty: 20 CAPSULE | Refills: 0 | Status: SHIPPED | OUTPATIENT
Start: 2020-01-20 | End: 2020-02-27 | Stop reason: ALTCHOICE

## 2020-01-20 RX ORDER — ALBUTEROL SULFATE 90 UG/1
2 AEROSOL, METERED RESPIRATORY (INHALATION) EVERY 6 HOURS PRN
Qty: 18 G | Refills: 0 | Status: SHIPPED | OUTPATIENT
Start: 2020-01-20 | End: 2020-05-11 | Stop reason: ALTCHOICE

## 2020-01-20 NOTE — PROGRESS NOTES
Assessment/Plan:   Diagnoses and all orders for this visit:    Acute sinusitis, recurrence not specified, unspecified location  -     albuterol (VENTOLIN HFA) 90 mcg/act inhaler; Inhale 2 puffs every 6 (six) hours as needed for wheezing  -     benzonatate (TESSALON) 200 MG capsule; Take 1 capsule (200 mg total) by mouth 3 (three) times a day as needed for cough  -     amoxicillin (AMOXIL) 875 mg tablet; Take 1 tablet (875 mg total) by mouth 2 (two) times a day for 10 days        Given her exam, I believe she is suffering from a sinusitis and mild reactive airway given the slight wheeze  I would like her to start the above regime and was traveling recently as well  If no improvement, she will let us know  Subjective:      Patient ID: Cameron Adam is a 62 y o  female  Maya Williamson is here today for a sick visit  Symptoms started on Friday when traveling back from [facility]  Reports sore throat that then progressed to headaches and symptoms as noted below  Reports noted dental discomfort as well  Has been taking Mucinex DM without much relief  URI    This is a new problem  The current episode started in the past 7 days  The problem has been unchanged  Associated symptoms include diarrhea, headaches, nausea and a sore throat  Pertinent negatives include no ear pain, plugged ear sensation or rhinorrhea  The following portions of the patient's history were reviewed and updated as appropriate: allergies, current medications, past family history, past medical history, past social history, past surgical history and problem list     Review of Systems   Constitutional: Positive for chills and fatigue  Negative for fever  HENT: Positive for sore throat  Negative for ear pain and rhinorrhea  Gastrointestinal: Positive for diarrhea and nausea  Neurological: Positive for headaches           Objective:      /78 (BP Location: Left arm, Patient Position: Sitting, Cuff Size: Standard)   Pulse 81   Temp 99 1 °F (37 3 °C) (Tympanic)   Ht 5' 6" (1 676 m)   Wt 77 5 kg (170 lb 12 8 oz)   SpO2 97%   BMI 27 57 kg/m²          Physical Exam   Constitutional: She is oriented to person, place, and time  She appears well-developed and well-nourished  No distress  HENT:   Head: Normocephalic and atraumatic  Right Ear: External ear normal    Left Ear: External ear normal    Nose: Right sinus exhibits maxillary sinus tenderness  Left sinus exhibits maxillary sinus tenderness  Mouth/Throat: Oropharynx is clear and moist    Eyes: Conjunctivae and EOM are normal  Right eye exhibits no discharge  Left eye exhibits no discharge  No scleral icterus  Neck: Normal range of motion  Cardiovascular: Normal rate, regular rhythm and normal heart sounds  No murmur heard  Pulmonary/Chest: Effort normal  No respiratory distress  Mild wheeze in the upper airways    Musculoskeletal: Normal range of motion  She exhibits no edema  Lymphadenopathy:     She has cervical adenopathy  Neurological: She is alert and oriented to person, place, and time  Skin: Skin is warm and dry  She is not diaphoretic  No erythema  Psychiatric: She has a normal mood and affect  Her speech is normal and behavior is normal  Judgment and thought content normal    Vitals reviewed

## 2020-02-06 DIAGNOSIS — R73.01 IFG (IMPAIRED FASTING GLUCOSE): ICD-10-CM

## 2020-02-06 DIAGNOSIS — R63.5 ABNORMAL WEIGHT GAIN: ICD-10-CM

## 2020-02-06 DIAGNOSIS — E66.9 CLASS 1 OBESITY: ICD-10-CM

## 2020-02-07 NOTE — TELEPHONE ENCOUNTER
Okay, please call her and let her know that I had already sent it in for the 2 4 mg dose as that's what she was on last, but she can continue the 3 mg! Thank you!

## 2020-02-25 NOTE — PROGRESS NOTES
Assessment/Plan:    Overweight  -Patient is pursuing the conservative program  -Initial weight loss goal of 5-10% weight loss for improved health-met  -Avoid Contrave- had adverse effect on mood  -Tried phentermine, but stopped because she reports she carries the gene for macular degeneration    -Denied family and personal hx of MEN2 and medullary thyroid/thyroid carcinoma  Denied personal hx of pancreatitis  -On Saxenda (start weight 189 6 lbs)     Class 1 Obesity --> Overweight  Initial: 182 (8/16/17)  Initial at restart: 190 (5/1/19)  Current: 170 9 (-4 8 lbs since 11/18/19)  Change: -19 1 lbs (10 % TBW)  Goal: LTG under 160,     IFG (impaired fasting glucose)  -A1c 5 9 % --> reports she had a fingerstick A1c which was 5 5 %  -on Saxenda    Follow up in approximately 3 months with Non-Surgical Physician/Advanced Practitioner  Per 11/18/19 visit: Colonoscopy-6/6/17, per pt recall is 10 years    Goals:  Food log (ie ) www myfitnesspal com,sparkpeople  com,loseit com,calorieking  com,etc  baritastic  No sugary beverages  At least 64oz of water daily  Increase physical activity by 10 minutes daily  Gradually increase physical activity to a goal of 5 days per week for 30 minutes of MODERATE intensity PLUS 2 days per week of FULL BODY resistance training  5-10 servings of fruits and vegetables per day and 25-35 grams of dietary fiber per day, gradually increasing  2285-1357 calories + 100-150 calories on days of exercise     Diagnoses and all orders for this visit:    IFG (impaired fasting glucose)  -     liraglutide (SAXENDA) injection; Inject 0 5 mL (3 mg total) under the skin daily    Overweight  -     liraglutide (SAXENDA) injection;  Inject 0 5 mL (3 mg total) under the skin daily    Body mass index 27 0-27 9, adult    Subjective:   Chief Complaint   Patient presents with    Follow-up     mwm bakari     Patient ID: Delaney Pathak  is a 62 y o  female with excess weight/obesity here to pursue weight management  Patient is pursuing Conservative Program      HPI  The patient presents for [facility] follow up  States she was down more, but gained 5 lbs over her recent two week trip to [employer]: no, but eating more salads and using low calorie balsamic vinegar  Increased appetite/cravings: no; tolerating Saxenda 3 mg finds taking in the morning helps her with the evening cravings  Fruit/Vegetable servings: 1-2 fruit, 4-5 veg  Exercise: was back to exercising until her recent vacations  Would like to restart  Hydration: 40+ (improved from 24) oz of water, 1 cup coffee with creamer + occasional diet soda    The following portions of the patient's history were reviewed and updated as appropriate: allergies, current medications, past family history, past medical history, past social history, past surgical history and problem list     Review of Systems   Respiratory: Negative  Cardiovascular: Negative  Gastrointestinal: Negative  Psychiatric/Behavioral:        Denies concerns with mood     Objective:    /62 (BP Location: Left arm, Patient Position: Sitting, Cuff Size: Large)   Pulse 77   Temp 98 °F (36 7 °C) (Tympanic)   Resp 18   Ht 5' 6" (1 676 m)   Wt 77 5 kg (170 lb 14 4 oz)   BMI 27 58 kg/m²      Physical Exam   Nursing note and vitals reviewed  Constitutional   General appearance: Abnormal   well developed and overweight  Eyes No conjunctival pallor  Ears, Nose, Mouth, and Throat Oral mucosa moist    Pulmonary   Respiratory effort: No increased work of breathing or signs of respiratory distress  Auscultation of lungs: Clear to auscultation, equal breath sounds bilaterally, no wheezes, no rales, no rhonci  Cardiovascular   Auscultation of heart: Normal rate and rhythm, normal S1 and S2, without murmurs  Examination of extremities for edema and/or varicosities: Normal   no edema  Abdomen   Abdomen: Bowel sounds were normal  The abdomen was soft and nontender  Musculoskeletal   Gait and station: Normal     Psychiatric   Orientation to person, place and time: Normal     Affect: appropriate

## 2020-02-25 NOTE — ASSESSMENT & PLAN NOTE
-Patient is pursuing the conservative program  -Initial weight loss goal of 5-10% weight loss for improved health-met  -Avoid Contrave- had adverse effect on mood  -Tried phentermine, but stopped because she reports she carries the gene for macular degeneration    -Denied family and personal hx of MEN2 and medullary thyroid/thyroid carcinoma   Denied personal hx of pancreatitis  -On Saxenda (start weight 189 6 lbs)     Class 1 Obesity --> Overweight  Initial: 182 (8/16/17)  Initial at restart: 190 (5/1/19)  Current: 170 9 (-4 8 lbs since 11/18/19)  Change: -19 1 lbs (10 % TBW)  Goal: LTG under 160,

## 2020-02-27 ENCOUNTER — OFFICE VISIT (OUTPATIENT)
Dept: BARIATRICS | Facility: CLINIC | Age: 55
End: 2020-02-27
Payer: COMMERCIAL

## 2020-02-27 VITALS
BODY MASS INDEX: 27.47 KG/M2 | HEART RATE: 77 BPM | RESPIRATION RATE: 18 BRPM | HEIGHT: 66 IN | WEIGHT: 170.9 LBS | DIASTOLIC BLOOD PRESSURE: 62 MMHG | TEMPERATURE: 98 F | SYSTOLIC BLOOD PRESSURE: 108 MMHG

## 2020-02-27 DIAGNOSIS — R73.01 IFG (IMPAIRED FASTING GLUCOSE): Primary | ICD-10-CM

## 2020-02-27 DIAGNOSIS — E66.3 OVERWEIGHT: ICD-10-CM

## 2020-02-27 PROCEDURE — 99214 OFFICE O/P EST MOD 30 MIN: CPT | Performed by: PHYSICIAN ASSISTANT

## 2020-02-27 PROCEDURE — 3008F BODY MASS INDEX DOCD: CPT | Performed by: PHYSICIAN ASSISTANT

## 2020-02-27 PROCEDURE — 1036F TOBACCO NON-USER: CPT | Performed by: PHYSICIAN ASSISTANT

## 2020-02-27 NOTE — PATIENT INSTRUCTIONS
Goals: Food log (ie ) www myfitnesspal com,sparkpeople  com,loseit com,calorieking  com,etc  baritastic  No sugary beverages  At least 64oz of water daily  Increase physical activity by 10 minutes daily   Gradually increase physical activity to a goal of 5 days per week for 30 minutes of MODERATE intensity PLUS 2 days per week of FULL BODY resistance training  5-10 servings of fruits and vegetables per day and 25-35 grams of dietary fiber per day, gradually increasing  8872-4493 calories + 100-150 calories on days of exercise

## 2020-03-24 ENCOUNTER — OFFICE VISIT (OUTPATIENT)
Dept: FAMILY MEDICINE CLINIC | Facility: CLINIC | Age: 55
End: 2020-03-24
Payer: COMMERCIAL

## 2020-03-24 VITALS — HEIGHT: 66 IN | HEART RATE: 94 BPM | RESPIRATION RATE: 14 BRPM | TEMPERATURE: 100.1 F | BODY MASS INDEX: 27.58 KG/M2

## 2020-03-24 DIAGNOSIS — J06.9 VIRAL URI: Primary | ICD-10-CM

## 2020-03-24 LAB — S PYO AG THROAT QL: NEGATIVE

## 2020-03-24 PROCEDURE — 99213 OFFICE O/P EST LOW 20 MIN: CPT | Performed by: INTERNAL MEDICINE

## 2020-03-24 PROCEDURE — 87070 CULTURE OTHR SPECIMN AEROBIC: CPT | Performed by: INTERNAL MEDICINE

## 2020-03-24 PROCEDURE — 87880 STREP A ASSAY W/OPTIC: CPT | Performed by: INTERNAL MEDICINE

## 2020-03-24 PROCEDURE — 1036F TOBACCO NON-USER: CPT | Performed by: INTERNAL MEDICINE

## 2020-03-24 NOTE — PROGRESS NOTES
Assessment/Plan:     1  Viral URI      Evelyn was seen and examined in the office today  She does have a low grade fever with an exudate noted on the right tonsillar region  Symptoms seem more consistent with viral uri and/or allergies  Rapid strep was completed and was negative  She is going to continue to monitor her symptoms/temperate  She will likely go home for the day as she continues to monitor  She is wearing a mask currently as well for precautions  Subjective:      Patient ID: Edna Gastelum is a 62 y o  female  Chery Bonilla is being evaluated today secondary to not feeling well  Reports noticing slight sore throat that started over the weekend  This largely seemed to resolve  It then moved to rhinitis/sneezing, slight dry cough  She has been afebrile from what she knows  She took Zyrtec last night without much relief  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  The following portions of the patient's history were reviewed and updated as appropriate: allergies, past family history, past medical history, past social history, past surgical history and problem list     Current Outpatient Medications:     albuterol (VENTOLIN HFA) 90 mcg/act inhaler, Inhale 2 puffs every 6 (six) hours as needed for wheezing, Disp: 18 g, Rfl: 0    Insulin Pen Needle (BD PEN NEEDLE SONIA U/F) 32G X 4 MM MISC, Use as directed, Disp: 30 each, Rfl: 3    liraglutide (SAXENDA) injection, Inject 0 5 mL (3 mg total) under the skin daily, Disp: 45 mL, Rfl: 0    Review of Systems   Constitutional: Negative for fever and unexpected weight change  HENT: Positive for congestion, postnasal drip, rhinorrhea, sneezing and sore throat  Negative for ear discharge and ear pain  Respiratory: Positive for cough  Negative for chest tightness  Cardiovascular: Negative for chest pain  Gastrointestinal: Negative for abdominal pain and diarrhea  Neurological: Positive for light-headedness  Negative for dizziness and headaches  Psychiatric/Behavioral: Negative for sleep disturbance  Objective: There were no vitals taken for this visit  Physical Exam   Constitutional: She is oriented to person, place, and time  She appears well-developed and well-nourished  No distress  HENT:   Head: Normocephalic and atraumatic  Right Ear: External ear normal    Left Ear: External ear normal    Mouth/Throat: Tonsillar exudate  Eyes: Conjunctivae and EOM are normal  Right eye exhibits no discharge  Left eye exhibits no discharge  No scleral icterus  Neck: Normal range of motion  Cardiovascular: Normal rate, regular rhythm and normal heart sounds  No murmur heard  Pulmonary/Chest: Effort normal and breath sounds normal  No respiratory distress  She has no wheezes  Lymphadenopathy:     She has no cervical adenopathy  Neurological: She is alert and oriented to person, place, and time  Skin: Skin is warm and dry  She is not diaphoretic  Psychiatric: She has a normal mood and affect  Her speech is normal and behavior is normal  Judgment and thought content normal    Vitals reviewed

## 2020-03-26 LAB — BACTERIA THROAT CULT: NORMAL

## 2020-05-11 ENCOUNTER — TELEMEDICINE (OUTPATIENT)
Dept: FAMILY MEDICINE CLINIC | Facility: CLINIC | Age: 55
End: 2020-05-11
Payer: COMMERCIAL

## 2020-05-11 VITALS — HEART RATE: 80 BPM | TEMPERATURE: 97.1 F

## 2020-05-11 DIAGNOSIS — Z20.828 EXPOSURE TO SARS-ASSOCIATED CORONAVIRUS: ICD-10-CM

## 2020-05-11 DIAGNOSIS — Z20.828 EXPOSURE TO SARS-ASSOCIATED CORONAVIRUS: Primary | ICD-10-CM

## 2020-05-11 PROCEDURE — 99441 PR PHYS/QHP TELEPHONE EVALUATION 5-10 MIN: CPT | Performed by: INTERNAL MEDICINE

## 2020-05-11 PROCEDURE — U0003 INFECTIOUS AGENT DETECTION BY NUCLEIC ACID (DNA OR RNA); SEVERE ACUTE RESPIRATORY SYNDROME CORONAVIRUS 2 (SARS-COV-2) (CORONAVIRUS DISEASE [COVID-19]), AMPLIFIED PROBE TECHNIQUE, MAKING USE OF HIGH THROUGHPUT TECHNOLOGIES AS DESCRIBED BY CMS-2020-01-R: HCPCS

## 2020-05-12 LAB
INPATIENT: NORMAL
SARS-COV-2 RNA SPEC QL NAA+PROBE: NOT DETECTED

## 2020-05-27 ENCOUNTER — TELEPHONE (OUTPATIENT)
Dept: BARIATRICS | Facility: CLINIC | Age: 55
End: 2020-05-27

## 2020-05-27 ENCOUNTER — TELEMEDICINE (OUTPATIENT)
Dept: BARIATRICS | Facility: CLINIC | Age: 55
End: 2020-05-27
Payer: COMMERCIAL

## 2020-05-27 VITALS — WEIGHT: 170 LBS | BODY MASS INDEX: 27.44 KG/M2

## 2020-05-27 DIAGNOSIS — R73.01 IFG (IMPAIRED FASTING GLUCOSE): ICD-10-CM

## 2020-05-27 DIAGNOSIS — E66.3 OVERWEIGHT: Primary | ICD-10-CM

## 2020-05-27 PROCEDURE — 99213 OFFICE O/P EST LOW 20 MIN: CPT | Performed by: PHYSICIAN ASSISTANT

## 2020-05-28 ENCOUNTER — APPOINTMENT (OUTPATIENT)
Dept: LAB | Facility: CLINIC | Age: 55
End: 2020-05-28
Payer: COMMERCIAL

## 2020-05-28 ENCOUNTER — TELEPHONE (OUTPATIENT)
Dept: BARIATRICS | Facility: CLINIC | Age: 55
End: 2020-05-28

## 2020-05-28 DIAGNOSIS — N34.2 INFECTIVE URETHRITIS: Primary | ICD-10-CM

## 2020-05-28 LAB
BACTERIA UR QL AUTO: ABNORMAL /HPF
BILIRUB UR QL STRIP: NEGATIVE
CLARITY UR: ABNORMAL
COLOR UR: ABNORMAL
GLUCOSE UR STRIP-MCNC: NEGATIVE MG/DL
HGB UR QL STRIP.AUTO: NEGATIVE
HYALINE CASTS #/AREA URNS LPF: ABNORMAL /LPF
KETONES UR STRIP-MCNC: NEGATIVE MG/DL
LEUKOCYTE ESTERASE UR QL STRIP: ABNORMAL
NITRITE UR QL STRIP: POSITIVE
NON-SQ EPI CELLS URNS QL MICRO: ABNORMAL /HPF
PH UR STRIP.AUTO: 7 [PH]
PROT UR STRIP-MCNC: ABNORMAL MG/DL
RBC #/AREA URNS AUTO: ABNORMAL /HPF
SP GR UR STRIP.AUTO: 1.03 (ref 1–1.03)
UROBILINOGEN UR QL STRIP.AUTO: 1 E.U./DL
WBC #/AREA URNS AUTO: ABNORMAL /HPF

## 2020-05-28 PROCEDURE — 81001 URINALYSIS AUTO W/SCOPE: CPT | Performed by: INTERNAL MEDICINE

## 2020-05-28 RX ORDER — SULFAMETHOXAZOLE AND TRIMETHOPRIM 800; 160 MG/1; MG/1
1 TABLET ORAL EVERY 12 HOURS SCHEDULED
Qty: 12 TABLET | Refills: 0 | Status: SHIPPED | OUTPATIENT
Start: 2020-05-28 | End: 2020-06-03

## 2020-07-14 DIAGNOSIS — E66.3 OVERWEIGHT: ICD-10-CM

## 2020-07-14 DIAGNOSIS — R73.01 IFG (IMPAIRED FASTING GLUCOSE): ICD-10-CM

## 2020-07-14 DIAGNOSIS — Z12.31 SCREENING MAMMOGRAM, ENCOUNTER FOR: Primary | ICD-10-CM

## 2020-07-15 ENCOUNTER — HOSPITAL ENCOUNTER (OUTPATIENT)
Dept: MAMMOGRAPHY | Facility: MEDICAL CENTER | Age: 55
Discharge: HOME | End: 2020-07-15
Payer: COMMERCIAL

## 2020-07-15 VITALS — WEIGHT: 170 LBS | HEIGHT: 66 IN | BODY MASS INDEX: 27.32 KG/M2

## 2020-07-15 DIAGNOSIS — Z12.31 SCREENING MAMMOGRAM, ENCOUNTER FOR: ICD-10-CM

## 2020-07-15 PROCEDURE — 77067 SCR MAMMO BI INCL CAD: CPT

## 2020-07-15 PROCEDURE — 77063 BREAST TOMOSYNTHESIS BI: CPT

## 2020-11-14 DIAGNOSIS — B34.9 VIRAL INFECTION, UNSPECIFIED: Primary | ICD-10-CM

## 2020-11-14 DIAGNOSIS — B34.9 VIRAL INFECTION, UNSPECIFIED: ICD-10-CM

## 2020-11-14 PROCEDURE — U0003 INFECTIOUS AGENT DETECTION BY NUCLEIC ACID (DNA OR RNA); SEVERE ACUTE RESPIRATORY SYNDROME CORONAVIRUS 2 (SARS-COV-2) (CORONAVIRUS DISEASE [COVID-19]), AMPLIFIED PROBE TECHNIQUE, MAKING USE OF HIGH THROUGHPUT TECHNOLOGIES AS DESCRIBED BY CMS-2020-01-R: HCPCS | Performed by: INTERNAL MEDICINE

## 2020-11-15 LAB — SARS-COV-2 RNA SPEC QL NAA+PROBE: NOT DETECTED

## 2020-11-30 DIAGNOSIS — E66.3 OVERWEIGHT: ICD-10-CM

## 2020-11-30 DIAGNOSIS — E66.9 CLASS 1 OBESITY: ICD-10-CM

## 2020-11-30 DIAGNOSIS — R73.01 IFG (IMPAIRED FASTING GLUCOSE): ICD-10-CM

## 2020-11-30 DIAGNOSIS — R63.5 ABNORMAL WEIGHT GAIN: ICD-10-CM

## 2020-11-30 RX ORDER — PEN NEEDLE, DIABETIC 32GX 5/32"
NEEDLE, DISPOSABLE MISCELLANEOUS
Qty: 90 EACH | Refills: 1 | Status: SHIPPED | OUTPATIENT
Start: 2020-11-30

## 2021-04-02 ENCOUNTER — TRANSCRIBE ORDERS (OUTPATIENT)
Dept: ADMINISTRATIVE | Age: 56
End: 2021-04-02

## 2021-04-02 ENCOUNTER — APPOINTMENT (OUTPATIENT)
Dept: LAB | Age: 56
End: 2021-04-02
Payer: COMMERCIAL

## 2021-04-02 DIAGNOSIS — R73.01 IMPAIRED FASTING GLUCOSE: ICD-10-CM

## 2021-04-02 DIAGNOSIS — Z00.00 ROUTINE GENERAL MEDICAL EXAMINATION AT A HEALTH CARE FACILITY: ICD-10-CM

## 2021-04-02 DIAGNOSIS — E55.9 VITAMIN D DEFICIENCY DISEASE: Primary | ICD-10-CM

## 2021-04-02 DIAGNOSIS — E55.9 VITAMIN D DEFICIENCY DISEASE: ICD-10-CM

## 2021-04-02 LAB
25(OH)D3 SERPL-MCNC: 41.5 NG/ML (ref 30–100)
ALBUMIN SERPL BCP-MCNC: 4.2 G/DL (ref 3.5–5)
ALP SERPL-CCNC: 67 U/L (ref 46–116)
ALT SERPL W P-5'-P-CCNC: 27 U/L (ref 12–78)
ANION GAP SERPL CALCULATED.3IONS-SCNC: 2 MMOL/L (ref 4–13)
AST SERPL W P-5'-P-CCNC: 17 U/L (ref 5–45)
BASOPHILS # BLD AUTO: 0.04 THOUSANDS/ΜL (ref 0–0.1)
BASOPHILS NFR BLD AUTO: 1 % (ref 0–1)
BILIRUB SERPL-MCNC: 0.78 MG/DL (ref 0.2–1)
BUN SERPL-MCNC: 12 MG/DL (ref 5–25)
CALCIUM SERPL-MCNC: 9.6 MG/DL (ref 8.3–10.1)
CHLORIDE SERPL-SCNC: 108 MMOL/L (ref 100–108)
CHOLEST SERPL-MCNC: 180 MG/DL (ref 50–200)
CO2 SERPL-SCNC: 31 MMOL/L (ref 21–32)
CREAT SERPL-MCNC: 0.84 MG/DL (ref 0.6–1.3)
EOSINOPHIL # BLD AUTO: 0.09 THOUSAND/ΜL (ref 0–0.61)
EOSINOPHIL NFR BLD AUTO: 3 % (ref 0–6)
ERYTHROCYTE [DISTWIDTH] IN BLOOD BY AUTOMATED COUNT: 12 % (ref 11.6–15.1)
EST. AVERAGE GLUCOSE BLD GHB EST-MCNC: 117 MG/DL
GFR SERPL CREATININE-BSD FRML MDRD: 77 ML/MIN/1.73SQ M
GLUCOSE P FAST SERPL-MCNC: 107 MG/DL (ref 65–99)
HBA1C MFR BLD: 5.7 %
HCT VFR BLD AUTO: 43.1 % (ref 34.8–46.1)
HDLC SERPL-MCNC: 66 MG/DL
HGB BLD-MCNC: 14.1 G/DL (ref 11.5–15.4)
IMM GRANULOCYTES # BLD AUTO: 0.01 THOUSAND/UL (ref 0–0.2)
IMM GRANULOCYTES NFR BLD AUTO: 0 % (ref 0–2)
LDLC SERPL CALC-MCNC: 99 MG/DL (ref 0–100)
LYMPHOCYTES # BLD AUTO: 1.58 THOUSANDS/ΜL (ref 0.6–4.47)
LYMPHOCYTES NFR BLD AUTO: 45 % (ref 14–44)
MCH RBC QN AUTO: 31.1 PG (ref 26.8–34.3)
MCHC RBC AUTO-ENTMCNC: 32.7 G/DL (ref 31.4–37.4)
MCV RBC AUTO: 95 FL (ref 82–98)
MONOCYTES # BLD AUTO: 0.42 THOUSAND/ΜL (ref 0.17–1.22)
MONOCYTES NFR BLD AUTO: 12 % (ref 4–12)
NEUTROPHILS # BLD AUTO: 1.37 THOUSANDS/ΜL (ref 1.85–7.62)
NEUTS SEG NFR BLD AUTO: 39 % (ref 43–75)
NONHDLC SERPL-MCNC: 114 MG/DL
NRBC BLD AUTO-RTO: 0 /100 WBCS
PLATELET # BLD AUTO: 211 THOUSANDS/UL (ref 149–390)
PMV BLD AUTO: 10.8 FL (ref 8.9–12.7)
POTASSIUM SERPL-SCNC: 4.5 MMOL/L (ref 3.5–5.3)
PROT SERPL-MCNC: 7.1 G/DL (ref 6.4–8.2)
RBC # BLD AUTO: 4.53 MILLION/UL (ref 3.81–5.12)
SODIUM SERPL-SCNC: 141 MMOL/L (ref 136–145)
TRIGL SERPL-MCNC: 76 MG/DL
WBC # BLD AUTO: 3.51 THOUSAND/UL (ref 4.31–10.16)

## 2021-04-02 PROCEDURE — 36415 COLL VENOUS BLD VENIPUNCTURE: CPT

## 2021-04-02 PROCEDURE — 83036 HEMOGLOBIN GLYCOSYLATED A1C: CPT

## 2021-04-02 PROCEDURE — 80061 LIPID PANEL: CPT

## 2021-04-02 PROCEDURE — 80053 COMPREHEN METABOLIC PANEL: CPT

## 2021-04-02 PROCEDURE — 85025 COMPLETE CBC W/AUTO DIFF WBC: CPT

## 2021-04-02 PROCEDURE — 82306 VITAMIN D 25 HYDROXY: CPT

## 2021-07-01 ENCOUNTER — APPOINTMENT (OUTPATIENT)
Dept: LAB | Age: 56
End: 2021-07-01
Payer: COMMERCIAL

## 2021-07-01 DIAGNOSIS — R79.9 ABNORMAL BLOOD CHEMISTRY: ICD-10-CM

## 2021-07-01 LAB
BASOPHILS # BLD AUTO: 0.06 THOUSANDS/ΜL (ref 0–0.1)
BASOPHILS NFR BLD AUTO: 1 % (ref 0–1)
EOSINOPHIL # BLD AUTO: 0.16 THOUSAND/ΜL (ref 0–0.61)
EOSINOPHIL NFR BLD AUTO: 2 % (ref 0–6)
ERYTHROCYTE [DISTWIDTH] IN BLOOD BY AUTOMATED COUNT: 12.6 % (ref 11.6–15.1)
HCT VFR BLD AUTO: 40 % (ref 34.8–46.1)
HGB BLD-MCNC: 13.1 G/DL (ref 11.5–15.4)
IMM GRANULOCYTES # BLD AUTO: 0.02 THOUSAND/UL (ref 0–0.2)
IMM GRANULOCYTES NFR BLD AUTO: 0 % (ref 0–2)
LYMPHOCYTES # BLD AUTO: 2.12 THOUSANDS/ΜL (ref 0.6–4.47)
LYMPHOCYTES NFR BLD AUTO: 32 % (ref 14–44)
MCH RBC QN AUTO: 31.6 PG (ref 26.8–34.3)
MCHC RBC AUTO-ENTMCNC: 32.8 G/DL (ref 31.4–37.4)
MCV RBC AUTO: 97 FL (ref 82–98)
MONOCYTES # BLD AUTO: 0.52 THOUSAND/ΜL (ref 0.17–1.22)
MONOCYTES NFR BLD AUTO: 8 % (ref 4–12)
NEUTROPHILS # BLD AUTO: 3.81 THOUSANDS/ΜL (ref 1.85–7.62)
NEUTS SEG NFR BLD AUTO: 57 % (ref 43–75)
NRBC BLD AUTO-RTO: 0 /100 WBCS
PLATELET # BLD AUTO: 246 THOUSANDS/UL (ref 149–390)
PMV BLD AUTO: 11.5 FL (ref 8.9–12.7)
RBC # BLD AUTO: 4.14 MILLION/UL (ref 3.81–5.12)
WBC # BLD AUTO: 6.69 THOUSAND/UL (ref 4.31–10.16)

## 2021-07-01 PROCEDURE — 36415 COLL VENOUS BLD VENIPUNCTURE: CPT

## 2021-07-01 PROCEDURE — 85025 COMPLETE CBC W/AUTO DIFF WBC: CPT

## 2022-09-16 ENCOUNTER — APPOINTMENT (OUTPATIENT)
Dept: LAB | Age: 57
End: 2022-09-16
Payer: COMMERCIAL

## 2022-09-16 DIAGNOSIS — E55.9 VITAMIN D DEFICIENCY: ICD-10-CM

## 2022-09-16 DIAGNOSIS — Z00.00 ENCOUNTER FOR ANNUAL PHYSICAL EXAM: ICD-10-CM

## 2022-09-16 DIAGNOSIS — R73.01 IFG (IMPAIRED FASTING GLUCOSE): ICD-10-CM

## 2022-09-16 LAB
25(OH)D3 SERPL-MCNC: 26.2 NG/ML (ref 30–100)
ALBUMIN SERPL BCP-MCNC: 3.9 G/DL (ref 3.5–5)
ALP SERPL-CCNC: 69 U/L (ref 46–116)
ALT SERPL W P-5'-P-CCNC: 36 U/L (ref 12–78)
ANION GAP SERPL CALCULATED.3IONS-SCNC: 6 MMOL/L (ref 4–13)
AST SERPL W P-5'-P-CCNC: 16 U/L (ref 5–45)
BASOPHILS # BLD AUTO: 0.05 THOUSANDS/ΜL (ref 0–0.1)
BASOPHILS NFR BLD AUTO: 1 % (ref 0–1)
BILIRUB SERPL-MCNC: 1.01 MG/DL (ref 0.2–1)
BUN SERPL-MCNC: 17 MG/DL (ref 5–25)
CALCIUM SERPL-MCNC: 9.6 MG/DL (ref 8.3–10.1)
CHLORIDE SERPL-SCNC: 107 MMOL/L (ref 96–108)
CHOLEST SERPL-MCNC: 215 MG/DL
CO2 SERPL-SCNC: 26 MMOL/L (ref 21–32)
CREAT SERPL-MCNC: 0.95 MG/DL (ref 0.6–1.3)
EOSINOPHIL # BLD AUTO: 0.15 THOUSAND/ΜL (ref 0–0.61)
EOSINOPHIL NFR BLD AUTO: 3 % (ref 0–6)
ERYTHROCYTE [DISTWIDTH] IN BLOOD BY AUTOMATED COUNT: 12.5 % (ref 11.6–15.1)
EST. AVERAGE GLUCOSE BLD GHB EST-MCNC: 120 MG/DL
GFR SERPL CREATININE-BSD FRML MDRD: 65 ML/MIN/1.73SQ M
GLUCOSE P FAST SERPL-MCNC: 114 MG/DL (ref 65–99)
HBA1C MFR BLD: 5.8 %
HCT VFR BLD AUTO: 43.3 % (ref 34.8–46.1)
HDLC SERPL-MCNC: 61 MG/DL
HGB BLD-MCNC: 14.1 G/DL (ref 11.5–15.4)
IMM GRANULOCYTES # BLD AUTO: 0.01 THOUSAND/UL (ref 0–0.2)
IMM GRANULOCYTES NFR BLD AUTO: 0 % (ref 0–2)
LDLC SERPL CALC-MCNC: 133 MG/DL (ref 0–100)
LYMPHOCYTES # BLD AUTO: 1.57 THOUSANDS/ΜL (ref 0.6–4.47)
LYMPHOCYTES NFR BLD AUTO: 36 % (ref 14–44)
MCH RBC QN AUTO: 31.3 PG (ref 26.8–34.3)
MCHC RBC AUTO-ENTMCNC: 32.6 G/DL (ref 31.4–37.4)
MCV RBC AUTO: 96 FL (ref 82–98)
MONOCYTES # BLD AUTO: 0.43 THOUSAND/ΜL (ref 0.17–1.22)
MONOCYTES NFR BLD AUTO: 10 % (ref 4–12)
NEUTROPHILS # BLD AUTO: 2.19 THOUSANDS/ΜL (ref 1.85–7.62)
NEUTS SEG NFR BLD AUTO: 50 % (ref 43–75)
NRBC BLD AUTO-RTO: 0 /100 WBCS
PLATELET # BLD AUTO: 232 THOUSANDS/UL (ref 149–390)
PMV BLD AUTO: 10.8 FL (ref 8.9–12.7)
POTASSIUM SERPL-SCNC: 4.5 MMOL/L (ref 3.5–5.3)
PROT SERPL-MCNC: 7.2 G/DL (ref 6.4–8.4)
RBC # BLD AUTO: 4.5 MILLION/UL (ref 3.81–5.12)
SODIUM SERPL-SCNC: 139 MMOL/L (ref 135–147)
TRIGL SERPL-MCNC: 105 MG/DL
WBC # BLD AUTO: 4.4 THOUSAND/UL (ref 4.31–10.16)

## 2022-09-16 PROCEDURE — 83036 HEMOGLOBIN GLYCOSYLATED A1C: CPT

## 2022-09-16 PROCEDURE — 80061 LIPID PANEL: CPT

## 2022-09-16 PROCEDURE — 80053 COMPREHEN METABOLIC PANEL: CPT

## 2022-09-16 PROCEDURE — 85025 COMPLETE CBC W/AUTO DIFF WBC: CPT

## 2022-09-16 PROCEDURE — 36415 COLL VENOUS BLD VENIPUNCTURE: CPT

## 2022-09-16 PROCEDURE — 82306 VITAMIN D 25 HYDROXY: CPT

## 2022-12-12 ENCOUNTER — APPOINTMENT (OUTPATIENT)
Dept: URGENT CARE | Age: 57
End: 2022-12-12

## 2022-12-12 DIAGNOSIS — R05.1 ACUTE COUGH: ICD-10-CM

## 2022-12-13 LAB
FLUAV RNA RESP QL NAA+PROBE: NEGATIVE
FLUBV RNA RESP QL NAA+PROBE: NEGATIVE
SARS-COV-2 RNA RESP QL NAA+PROBE: NEGATIVE

## 2023-12-26 ENCOUNTER — APPOINTMENT (OUTPATIENT)
Dept: LAB | Age: 58
End: 2023-12-26
Payer: COMMERCIAL

## 2023-12-26 ENCOUNTER — HOSPITAL ENCOUNTER (OUTPATIENT)
Dept: RADIOLOGY | Age: 58
Discharge: HOME | End: 2023-12-26
Payer: COMMERCIAL

## 2023-12-26 VITALS — BODY MASS INDEX: 28.93 KG/M2 | WEIGHT: 180 LBS | HEIGHT: 66 IN

## 2023-12-26 DIAGNOSIS — Z12.31 SCREENING MAMMOGRAM FOR BREAST CANCER: ICD-10-CM

## 2023-12-26 DIAGNOSIS — R73.01 IFG (IMPAIRED FASTING GLUCOSE): ICD-10-CM

## 2023-12-26 LAB
ANION GAP SERPL CALCULATED.3IONS-SCNC: 9 MMOL/L
BUN SERPL-MCNC: 11 MG/DL (ref 5–25)
CALCIUM SERPL-MCNC: 9.7 MG/DL (ref 8.4–10.2)
CHLORIDE SERPL-SCNC: 103 MMOL/L (ref 96–108)
CHOLEST SERPL-MCNC: 196 MG/DL
CO2 SERPL-SCNC: 28 MMOL/L (ref 21–32)
CREAT SERPL-MCNC: 0.72 MG/DL (ref 0.6–1.3)
EST. AVERAGE GLUCOSE BLD GHB EST-MCNC: 123 MG/DL
GFR SERPL CREATININE-BSD FRML MDRD: 91 ML/MIN/1.73SQ M
GLUCOSE P FAST SERPL-MCNC: 101 MG/DL (ref 65–99)
HBA1C MFR BLD: 5.9 %
HDLC SERPL-MCNC: 66 MG/DL
LDLC SERPL CALC-MCNC: 111 MG/DL (ref 0–100)
NONHDLC SERPL-MCNC: 130 MG/DL
POTASSIUM SERPL-SCNC: 4.2 MMOL/L (ref 3.5–5.3)
SODIUM SERPL-SCNC: 140 MMOL/L (ref 135–147)
TRIGL SERPL-MCNC: 97 MG/DL

## 2023-12-26 PROCEDURE — 83036 HEMOGLOBIN GLYCOSYLATED A1C: CPT

## 2023-12-26 PROCEDURE — 77067 SCR MAMMO BI INCL CAD: CPT

## 2023-12-26 PROCEDURE — 80048 BASIC METABOLIC PNL TOTAL CA: CPT

## 2023-12-26 PROCEDURE — 36415 COLL VENOUS BLD VENIPUNCTURE: CPT

## 2023-12-26 PROCEDURE — 77063 BREAST TOMOSYNTHESIS BI: CPT

## 2023-12-26 PROCEDURE — 80061 LIPID PANEL: CPT

## 2024-08-22 ENCOUNTER — APPOINTMENT (OUTPATIENT)
Dept: LAB | Age: 59
End: 2024-08-22
Payer: COMMERCIAL

## 2024-08-22 DIAGNOSIS — E55.9 AVITAMINOSIS D: ICD-10-CM

## 2024-08-22 DIAGNOSIS — Z00.00 ROUTINE GENERAL MEDICAL EXAMINATION AT A HEALTH CARE FACILITY: ICD-10-CM

## 2024-08-22 DIAGNOSIS — E78.5 HYPERLIPIDEMIA, UNSPECIFIED HYPERLIPIDEMIA TYPE: ICD-10-CM

## 2024-08-22 DIAGNOSIS — R73.01 IMPAIRED FASTING GLUCOSE: ICD-10-CM

## 2024-08-22 LAB
25(OH)D3 SERPL-MCNC: 17 NG/ML (ref 30–100)
ALBUMIN SERPL BCG-MCNC: 4.5 G/DL (ref 3.5–5)
ALP SERPL-CCNC: 61 U/L (ref 34–104)
ALT SERPL W P-5'-P-CCNC: 28 U/L (ref 7–52)
ANION GAP SERPL CALCULATED.3IONS-SCNC: 9 MMOL/L (ref 4–13)
AST SERPL W P-5'-P-CCNC: 22 U/L (ref 13–39)
BASOPHILS # BLD AUTO: 0.04 THOUSANDS/ÂΜL (ref 0–0.1)
BASOPHILS NFR BLD AUTO: 1 % (ref 0–1)
BILIRUB SERPL-MCNC: 0.69 MG/DL (ref 0.2–1)
BUN SERPL-MCNC: 10 MG/DL (ref 5–25)
CALCIUM SERPL-MCNC: 9.6 MG/DL (ref 8.4–10.2)
CHLORIDE SERPL-SCNC: 103 MMOL/L (ref 96–108)
CHOLEST SERPL-MCNC: 193 MG/DL
CO2 SERPL-SCNC: 29 MMOL/L (ref 21–32)
CREAT SERPL-MCNC: 0.67 MG/DL (ref 0.6–1.3)
EOSINOPHIL # BLD AUTO: 0.09 THOUSAND/ÂΜL (ref 0–0.61)
EOSINOPHIL NFR BLD AUTO: 2 % (ref 0–6)
ERYTHROCYTE [DISTWIDTH] IN BLOOD BY AUTOMATED COUNT: 12.7 % (ref 11.6–15.1)
EST. AVERAGE GLUCOSE BLD GHB EST-MCNC: 123 MG/DL
GFR SERPL CREATININE-BSD FRML MDRD: 95 ML/MIN/1.73SQ M
GLUCOSE P FAST SERPL-MCNC: 100 MG/DL (ref 65–99)
HBA1C MFR BLD: 5.9 %
HCT VFR BLD AUTO: 38.4 % (ref 34.8–46.1)
HDLC SERPL-MCNC: 58 MG/DL
HGB BLD-MCNC: 12.7 G/DL (ref 11.5–15.4)
IMM GRANULOCYTES # BLD AUTO: 0.01 THOUSAND/UL (ref 0–0.2)
IMM GRANULOCYTES NFR BLD AUTO: 0 % (ref 0–2)
LDLC SERPL CALC-MCNC: 114 MG/DL (ref 0–100)
LYMPHOCYTES # BLD AUTO: 1.52 THOUSANDS/ÂΜL (ref 0.6–4.47)
LYMPHOCYTES NFR BLD AUTO: 33 % (ref 14–44)
MCH RBC QN AUTO: 31.8 PG (ref 26.8–34.3)
MCHC RBC AUTO-ENTMCNC: 33.1 G/DL (ref 31.4–37.4)
MCV RBC AUTO: 96 FL (ref 82–98)
MONOCYTES # BLD AUTO: 0.42 THOUSAND/ÂΜL (ref 0.17–1.22)
MONOCYTES NFR BLD AUTO: 9 % (ref 4–12)
NEUTROPHILS # BLD AUTO: 2.6 THOUSANDS/ÂΜL (ref 1.85–7.62)
NEUTS SEG NFR BLD AUTO: 55 % (ref 43–75)
NONHDLC SERPL-MCNC: 135 MG/DL
NRBC BLD AUTO-RTO: 0 /100 WBCS
PLATELET # BLD AUTO: 258 THOUSANDS/UL (ref 149–390)
PMV BLD AUTO: 10.5 FL (ref 8.9–12.7)
POTASSIUM SERPL-SCNC: 4 MMOL/L (ref 3.5–5.3)
PROT SERPL-MCNC: 6.8 G/DL (ref 6.4–8.4)
RBC # BLD AUTO: 4 MILLION/UL (ref 3.81–5.12)
SODIUM SERPL-SCNC: 141 MMOL/L (ref 135–147)
TRIGL SERPL-MCNC: 104 MG/DL
TSH SERPL DL<=0.05 MIU/L-ACNC: 2.13 UIU/ML (ref 0.45–4.5)
WBC # BLD AUTO: 4.68 THOUSAND/UL (ref 4.31–10.16)

## 2024-08-22 PROCEDURE — 83036 HEMOGLOBIN GLYCOSYLATED A1C: CPT

## 2024-08-22 PROCEDURE — 82306 VITAMIN D 25 HYDROXY: CPT

## 2024-08-22 PROCEDURE — 80061 LIPID PANEL: CPT

## 2024-08-22 PROCEDURE — 87086 URINE CULTURE/COLONY COUNT: CPT

## 2024-08-22 PROCEDURE — 84443 ASSAY THYROID STIM HORMONE: CPT

## 2024-08-22 PROCEDURE — 80053 COMPREHEN METABOLIC PANEL: CPT

## 2024-08-22 PROCEDURE — 36415 COLL VENOUS BLD VENIPUNCTURE: CPT

## 2024-08-22 PROCEDURE — 85025 COMPLETE CBC W/AUTO DIFF WBC: CPT

## 2024-08-24 LAB — BACTERIA UR CULT: NORMAL

## 2025-01-09 ENCOUNTER — HOSPITAL ENCOUNTER (OUTPATIENT)
Dept: RADIOLOGY | Age: 60
Discharge: HOME | End: 2025-01-09
Payer: COMMERCIAL

## 2025-01-09 VITALS — HEIGHT: 66 IN | BODY MASS INDEX: 30.53 KG/M2 | WEIGHT: 190 LBS

## 2025-01-09 DIAGNOSIS — Z12.31 SCREENING MAMMOGRAM FOR BREAST CANCER: ICD-10-CM

## 2025-01-09 PROCEDURE — 77067 SCR MAMMO BI INCL CAD: CPT

## 2025-01-09 PROCEDURE — 77063 BREAST TOMOSYNTHESIS BI: CPT

## 2025-01-28 ENCOUNTER — HOSPITAL ENCOUNTER (OUTPATIENT)
Dept: RADIOLOGY | Age: 60
Discharge: HOME | End: 2025-01-28
Payer: COMMERCIAL

## 2025-01-28 VITALS — BODY MASS INDEX: 33.66 KG/M2 | WEIGHT: 202 LBS | HEIGHT: 65 IN

## 2025-01-28 DIAGNOSIS — Z78.0 POST-MENOPAUSAL: ICD-10-CM

## 2025-01-28 PROCEDURE — 77080 DXA BONE DENSITY AXIAL: CPT

## 2025-03-05 ENCOUNTER — ANNUAL EXAM (OUTPATIENT)
Dept: OBGYN CLINIC | Facility: CLINIC | Age: 60
End: 2025-03-05
Payer: COMMERCIAL

## 2025-03-05 VITALS
BODY MASS INDEX: 33.72 KG/M2 | SYSTOLIC BLOOD PRESSURE: 122 MMHG | HEIGHT: 65 IN | WEIGHT: 202.4 LBS | DIASTOLIC BLOOD PRESSURE: 80 MMHG

## 2025-03-05 DIAGNOSIS — Z01.419 ENCOUNTER FOR GYNECOLOGICAL EXAMINATION WITHOUT ABNORMAL FINDING: Primary | ICD-10-CM

## 2025-03-05 PROCEDURE — 99396 PREV VISIT EST AGE 40-64: CPT | Performed by: OBSTETRICS & GYNECOLOGY

## 2025-03-05 PROCEDURE — G0476 HPV COMBO ASSAY CA SCREEN: HCPCS | Performed by: OBSTETRICS & GYNECOLOGY

## 2025-03-05 PROCEDURE — G0145 SCR C/V CYTO,THINLAYER,RESCR: HCPCS | Performed by: OBSTETRICS & GYNECOLOGY

## 2025-03-05 NOTE — PROGRESS NOTES
Name: Irene Julian      : 1965      MRN: [id number]  Encounter Provider: Roxie Tavares MD  Encounter Date: 3/5/2025   Encounter department: [facility] [place] [facility]  :  Assessment & Plan  Encounter for gynecological examination without abnormal finding             History of Present Illness   HPI  Irene Julian is a 59 y.o. post-menopausal female who presents for annual exam.  She is  2 para 2-0-0-2.  She has no complaints related to OB/GYN.  She had 1 episode of postmenopausal bleeding in 2018 and has had no other bleeding or problems since.  She has no history of abnormal Pap smears, and her last Pap smear was more than 5 years ago and we will repeat one today.  Has been generally in very good health he had a normal mammogram in January and a chest scan which showed osteopenia.  She is taking vitamin D supplementation (he she generally has low vitamin D) and engaging in weightbearing exercise, even taking a class in the same.  Should return in 1 year or as needed.      Review of Systems   Constitutional:  Negative for chills, diaphoresis, fatigue, fever and unexpected weight change.   HENT:  Negative for congestion, sinus pressure, sinus pain, tinnitus and trouble swallowing.    Eyes:  Negative for visual disturbance.   Respiratory:  Negative for cough, chest tightness and shortness of breath.    Cardiovascular:  Negative for chest pain, palpitations and leg swelling.   Gastrointestinal:  Negative for abdominal distention, abdominal pain, anal bleeding, constipation, diarrhea, nausea, rectal pain and vomiting.   Endocrine: Negative for heat intolerance.   Genitourinary:  Negative for difficulty urinating, dysuria, flank pain, frequency, genital sores, hematuria and urgency.   Musculoskeletal:  Negative for arthralgias, back pain and joint swelling.   Skin:  Negative for rash.   Allergic/Immunologic: Negative for environmental allergies and food allergies.   Neurological:   "Negative for headaches.   Hematological:  Negative for adenopathy. Does not bruise/bleed easily.   Psychiatric/Behavioral:  Negative for decreased concentration and dysphoric mood. The patient is not nervous/anxious.           Objective   /80 (BP Location: Left arm, Patient Position: Sitting, Cuff Size: Adult)   Ht 5' 4.5\" (1.638 m)   Wt 91.8 kg (202 lb 6.4 oz)   BMI 34.21 kg/m²      Physical Exam  Vitals reviewed. Exam conducted with a chaperone present.   Constitutional:       Appearance: Normal appearance.   HENT:      Mouth/Throat:      Mouth: Mucous membranes are moist.      Pharynx: Oropharynx is clear.   Eyes:      Conjunctiva/sclera: Conjunctivae normal.      Pupils: Pupils are equal, round, and reactive to light.   Cardiovascular:      Rate and Rhythm: Normal rate and regular rhythm.      Pulses: Normal pulses.      Heart sounds: Normal heart sounds.   Pulmonary:      Effort: Pulmonary effort is normal.      Breath sounds: Normal breath sounds.   Abdominal:      General: Bowel sounds are normal.      Palpations: Abdomen is soft.   Genitourinary:     General: Normal vulva.      Exam position: Lithotomy position.      Sundar stage (genital): 5.      Vagina: Normal.      Cervix: Normal.      Uterus: Normal.       Adnexa: Right adnexa normal and left adnexa normal.      Rectum: Normal.   Musculoskeletal:         General: Normal range of motion.      Cervical back: Neck supple.   Skin:     General: Skin is warm and dry.   Neurological:      General: No focal deficit present.      Mental Status: She is alert and oriented to person, place, and time.   Psychiatric:         Mood and Affect: Mood normal.         "

## 2025-03-06 LAB
HPV HR 12 DNA CVX QL NAA+PROBE: NEGATIVE
HPV16 DNA CVX QL NAA+PROBE: NEGATIVE
HPV18 DNA CVX QL NAA+PROBE: NEGATIVE

## 2025-03-11 LAB
LAB AP GYN PRIMARY INTERPRETATION: NORMAL
Lab: NORMAL

## 2025-03-12 ENCOUNTER — RESULTS FOLLOW-UP (OUTPATIENT)
Dept: LABOR AND DELIVERY | Facility: HOSPITAL | Age: 60
End: 2025-03-12

## 2025-06-11 NOTE — PATIENT INSTRUCTIONS
1  Drink plenty fluids  2   Take probiotics [i e  Yogurt, Acidophilus, Florastor (liquid)] daily  3   Over-the-counter cough and cold medications as needed for symptomatic care  4    Advance activities as tolerated  5    Follow-up with your primary care physician in 3-4 days  6   Go to emergency room if symptoms are worsening 
Unknown